# Patient Record
Sex: MALE | Race: WHITE | NOT HISPANIC OR LATINO | Employment: FULL TIME | URBAN - NONMETROPOLITAN AREA
[De-identification: names, ages, dates, MRNs, and addresses within clinical notes are randomized per-mention and may not be internally consistent; named-entity substitution may affect disease eponyms.]

---

## 2017-04-26 ENCOUNTER — TRANSCRIBE ORDERS (OUTPATIENT)
Dept: ADMINISTRATIVE | Facility: HOSPITAL | Age: 62
End: 2017-04-26

## 2017-04-26 DIAGNOSIS — R10.9 STOMACH ACHE: ICD-10-CM

## 2017-04-26 DIAGNOSIS — R51.9 FACIAL PAIN: Primary | ICD-10-CM

## 2017-04-28 ENCOUNTER — TRANSCRIBE ORDERS (OUTPATIENT)
Dept: ADMINISTRATIVE | Facility: HOSPITAL | Age: 62
End: 2017-04-28

## 2017-05-09 ENCOUNTER — HOSPITAL ENCOUNTER (OUTPATIENT)
Dept: MRI IMAGING | Facility: HOSPITAL | Age: 62
End: 2017-05-09

## 2017-05-09 ENCOUNTER — HOSPITAL ENCOUNTER (OUTPATIENT)
Dept: ULTRASOUND IMAGING | Facility: HOSPITAL | Age: 62
End: 2017-05-09

## 2017-05-12 ENCOUNTER — TRANSCRIBE ORDERS (OUTPATIENT)
Dept: ADMINISTRATIVE | Facility: HOSPITAL | Age: 62
End: 2017-05-12

## 2017-10-10 ENCOUNTER — TELEPHONE (OUTPATIENT)
Dept: SURGERY | Facility: CLINIC | Age: 62
End: 2017-10-10

## 2017-10-10 NOTE — TELEPHONE ENCOUNTER
Dr. James from [facility] would like patient to be scheduled for colonoscopy. Please! Thank you!!

## 2017-10-13 NOTE — TELEPHONE ENCOUNTER
Pt called back and is scheduled at [facility] on 11/17/17. Info mailed to [address] [place] [id number].

## 2017-10-19 ENCOUNTER — PREP FOR SURGERY (OUTPATIENT)
Dept: OTHER | Facility: HOSPITAL | Age: 62
End: 2017-10-19

## 2017-10-19 DIAGNOSIS — Z12.11 ENCOUNTER FOR SCREENING COLONOSCOPY: Primary | ICD-10-CM

## 2017-10-24 PROBLEM — Z12.11 ENCOUNTER FOR SCREENING COLONOSCOPY: Status: ACTIVE | Noted: 2017-10-24

## 2017-11-16 ENCOUNTER — TELEPHONE (OUTPATIENT)
Dept: SURGERY | Facility: CLINIC | Age: 62
End: 2017-11-16

## 2017-11-16 NOTE — TELEPHONE ENCOUNTER
Senia FERREIRA, spoke to patient who said he had to call on his insurance.  He also said he would call PAT back as soon as he hung up with Senia.

## 2017-11-16 NOTE — TELEPHONE ENCOUNTER
I checked online and with Dr James's ([facility]) office, spoke to Faby in Referrals. The patient does not have insurance.      I tried to contact the patient there was no answer.  I left a message that it was important that he call me back.    Senia-who called from the hospital earlier about his insurance?    Thanks,

## 2020-02-01 ENCOUNTER — APPOINTMENT (OUTPATIENT)
Dept: GENERAL RADIOLOGY | Facility: HOSPITAL | Age: 65
End: 2020-02-01

## 2020-02-01 ENCOUNTER — HOSPITAL ENCOUNTER (INPATIENT)
Facility: HOSPITAL | Age: 65
LOS: 1 days | Discharge: HOME | End: 2020-02-02
Attending: EMERGENCY MEDICINE | Admitting: FAMILY MEDICINE

## 2020-02-01 DIAGNOSIS — F10.20 CHRONIC ALCOHOL DEPENDENCE, CONTINUOUS (HCC): ICD-10-CM

## 2020-02-01 DIAGNOSIS — F10.11 HISTORY OF ETOH ABUSE: ICD-10-CM

## 2020-02-01 DIAGNOSIS — R00.2 PALPITATIONS: Primary | ICD-10-CM

## 2020-02-01 DIAGNOSIS — R07.9 CHEST PAIN, UNSPECIFIED TYPE: ICD-10-CM

## 2020-02-01 PROBLEM — I49.5 TACHY-BRADY SYNDROME: Status: ACTIVE | Noted: 2020-02-01

## 2020-02-01 PROBLEM — I10 ESSENTIAL HYPERTENSION: Status: ACTIVE | Noted: 2020-02-01

## 2020-02-01 LAB
ALBUMIN SERPL-MCNC: 4.9 G/DL (ref 3.5–5.2)
ALBUMIN/GLOB SERPL: 1.3 G/DL
ALP SERPL-CCNC: 91 U/L (ref 39–117)
ALT SERPL W P-5'-P-CCNC: 28 U/L (ref 1–41)
AMPHET+METHAMPHET UR QL: NEGATIVE
AMPHETAMINES UR QL: NEGATIVE
ANION GAP SERPL CALCULATED.3IONS-SCNC: 16.7 MMOL/L (ref 5–15)
AST SERPL-CCNC: 33 U/L (ref 1–40)
BARBITURATES UR QL SCN: NEGATIVE
BASOPHILS # BLD AUTO: 0.09 10*3/MM3 (ref 0–0.2)
BASOPHILS NFR BLD AUTO: 0.9 % (ref 0–1.5)
BENZODIAZ UR QL SCN: NEGATIVE
BILIRUB SERPL-MCNC: 0.4 MG/DL (ref 0.2–1.2)
BILIRUB UR QL STRIP: NEGATIVE
BUN BLD-MCNC: 14 MG/DL (ref 8–23)
BUN/CREAT SERPL: 16.7 (ref 7–25)
BUPRENORPHINE SERPL-MCNC: NEGATIVE NG/ML
CALCIUM SPEC-SCNC: 10 MG/DL (ref 8.6–10.5)
CANNABINOIDS SERPL QL: NEGATIVE
CHLORIDE SERPL-SCNC: 102 MMOL/L (ref 98–107)
CHOLEST SERPL-MCNC: 178 MG/DL (ref 0–200)
CLARITY UR: CLEAR
CO2 SERPL-SCNC: 21.3 MMOL/L (ref 22–29)
COCAINE UR QL: NEGATIVE
COLOR UR: YELLOW
CREAT BLD-MCNC: 0.84 MG/DL (ref 0.76–1.27)
DEPRECATED RDW RBC AUTO: 48.6 FL (ref 37–54)
EOSINOPHIL # BLD AUTO: 0.18 10*3/MM3 (ref 0–0.4)
EOSINOPHIL NFR BLD AUTO: 1.7 % (ref 0.3–6.2)
ERYTHROCYTE [DISTWIDTH] IN BLOOD BY AUTOMATED COUNT: 14.2 % (ref 12.3–15.4)
ETHANOL BLD-MCNC: 25 MG/DL (ref 0–10)
ETHANOL UR QL: 0.03 %
GFR SERPL CREATININE-BSD FRML MDRD: 112 ML/MIN/1.73
GFR SERPL CREATININE-BSD FRML MDRD: 93 ML/MIN/1.73
GLOBULIN UR ELPH-MCNC: 3.9 GM/DL
GLUCOSE BLD-MCNC: 135 MG/DL (ref 65–99)
GLUCOSE UR STRIP-MCNC: NEGATIVE MG/DL
HBA1C MFR BLD: 5.5 % (ref 4.8–5.6)
HCT VFR BLD AUTO: 47 % (ref 37.5–51)
HDLC SERPL-MCNC: 69 MG/DL (ref 40–60)
HGB BLD-MCNC: 16.1 G/DL (ref 13–17.7)
HGB UR QL STRIP.AUTO: NEGATIVE
HOLD SPECIMEN: NORMAL
HOLD SPECIMEN: NORMAL
IMM GRANULOCYTES # BLD AUTO: 0.04 10*3/MM3 (ref 0–0.05)
IMM GRANULOCYTES NFR BLD AUTO: 0.4 % (ref 0–0.5)
INR PPP: 1.02 (ref 0.9–1.1)
KETONES UR QL STRIP: NEGATIVE
LDLC SERPL CALC-MCNC: 79 MG/DL (ref 0–100)
LDLC/HDLC SERPL: 1.15 {RATIO}
LEUKOCYTE ESTERASE UR QL STRIP.AUTO: NEGATIVE
LYMPHOCYTES # BLD AUTO: 2.64 10*3/MM3 (ref 0.7–3.1)
LYMPHOCYTES NFR BLD AUTO: 25.3 % (ref 19.6–45.3)
MAGNESIUM SERPL-MCNC: 2.2 MG/DL (ref 1.6–2.4)
MCH RBC QN AUTO: 31.9 PG (ref 26.6–33)
MCHC RBC AUTO-ENTMCNC: 34.3 G/DL (ref 31.5–35.7)
MCV RBC AUTO: 93.1 FL (ref 79–97)
METHADONE UR QL SCN: NEGATIVE
MONOCYTES # BLD AUTO: 0.81 10*3/MM3 (ref 0.1–0.9)
MONOCYTES NFR BLD AUTO: 7.8 % (ref 5–12)
NEUTROPHILS # BLD AUTO: 6.67 10*3/MM3 (ref 1.7–7)
NEUTROPHILS NFR BLD AUTO: 63.9 % (ref 42.7–76)
NITRITE UR QL STRIP: NEGATIVE
NRBC BLD AUTO-RTO: 0 /100 WBC (ref 0–0.2)
OPIATES UR QL: NEGATIVE
OXYCODONE UR QL SCN: NEGATIVE
PCP UR QL SCN: NEGATIVE
PH UR STRIP.AUTO: 7.5 [PH] (ref 5–8)
PLATELET # BLD AUTO: 345 10*3/MM3 (ref 140–450)
PMV BLD AUTO: 9.3 FL (ref 6–12)
POTASSIUM BLD-SCNC: 4.1 MMOL/L (ref 3.5–5.2)
PROPOXYPH UR QL: NEGATIVE
PROT SERPL-MCNC: 8.8 G/DL (ref 6–8.5)
PROT UR QL STRIP: NEGATIVE
PROTHROMBIN TIME: 13.8 SECONDS (ref 12–15.1)
RBC # BLD AUTO: 5.05 10*6/MM3 (ref 4.14–5.8)
SODIUM BLD-SCNC: 140 MMOL/L (ref 136–145)
SP GR UR STRIP: 1.01 (ref 1–1.03)
T4 FREE SERPL-MCNC: 0.79 NG/DL (ref 0.93–1.7)
TRICYCLICS UR QL SCN: NEGATIVE
TRIGL SERPL-MCNC: 149 MG/DL (ref 0–150)
TROPONIN I SERPL-MCNC: 0.01 NG/ML (ref 0–0.05)
TROPONIN T SERPL-MCNC: <0.01 NG/ML (ref 0–0.03)
TROPONIN T SERPL-MCNC: <0.01 NG/ML (ref 0–0.03)
TSH SERPL DL<=0.05 MIU/L-ACNC: 1.87 UIU/ML (ref 0.27–4.2)
UROBILINOGEN UR QL STRIP: NORMAL
VLDLC SERPL-MCNC: 29.8 MG/DL
WBC NRBC COR # BLD: 10.43 10*3/MM3 (ref 3.4–10.8)
WHOLE BLOOD HOLD SPECIMEN: NORMAL
WHOLE BLOOD HOLD SPECIMEN: NORMAL

## 2020-02-01 PROCEDURE — 99222 1ST HOSP IP/OBS MODERATE 55: CPT | Performed by: FAMILY MEDICINE

## 2020-02-01 PROCEDURE — 25010000002 ENOXAPARIN PER 10 MG: Performed by: FAMILY MEDICINE

## 2020-02-01 PROCEDURE — 83036 HEMOGLOBIN GLYCOSYLATED A1C: CPT | Performed by: INTERNAL MEDICINE

## 2020-02-01 PROCEDURE — 85025 COMPLETE CBC W/AUTO DIFF WBC: CPT | Performed by: EMERGENCY MEDICINE

## 2020-02-01 PROCEDURE — 80061 LIPID PANEL: CPT | Performed by: INTERNAL MEDICINE

## 2020-02-01 PROCEDURE — 84484 ASSAY OF TROPONIN QUANT: CPT | Performed by: EMERGENCY MEDICINE

## 2020-02-01 PROCEDURE — 93005 ELECTROCARDIOGRAM TRACING: CPT | Performed by: EMERGENCY MEDICINE

## 2020-02-01 PROCEDURE — 83735 ASSAY OF MAGNESIUM: CPT | Performed by: EMERGENCY MEDICINE

## 2020-02-01 PROCEDURE — 85610 PROTHROMBIN TIME: CPT | Performed by: FAMILY MEDICINE

## 2020-02-01 PROCEDURE — 80053 COMPREHEN METABOLIC PANEL: CPT | Performed by: EMERGENCY MEDICINE

## 2020-02-01 PROCEDURE — 84439 ASSAY OF FREE THYROXINE: CPT | Performed by: INTERNAL MEDICINE

## 2020-02-01 PROCEDURE — 71045 X-RAY EXAM CHEST 1 VIEW: CPT

## 2020-02-01 PROCEDURE — 25010000002 MAGNESIUM SULFATE 2 GM/50ML SOLUTION: Performed by: INTERNAL MEDICINE

## 2020-02-01 PROCEDURE — 81003 URINALYSIS AUTO W/O SCOPE: CPT | Performed by: FAMILY MEDICINE

## 2020-02-01 PROCEDURE — 80307 DRUG TEST PRSMV CHEM ANLYZR: CPT | Performed by: EMERGENCY MEDICINE

## 2020-02-01 PROCEDURE — 84443 ASSAY THYROID STIM HORMONE: CPT | Performed by: INTERNAL MEDICINE

## 2020-02-01 PROCEDURE — 99284 EMERGENCY DEPT VISIT MOD MDM: CPT

## 2020-02-01 PROCEDURE — 84484 ASSAY OF TROPONIN QUANT: CPT | Performed by: FAMILY MEDICINE

## 2020-02-01 RX ORDER — FAMOTIDINE 20 MG/1
20 TABLET, FILM COATED ORAL
Status: DISCONTINUED | OUTPATIENT
Start: 2020-02-01 | End: 2020-02-02 | Stop reason: HOSPADM

## 2020-02-01 RX ORDER — METOPROLOL TARTRATE 5 MG/5ML
5 INJECTION INTRAVENOUS ONCE
Status: COMPLETED | OUTPATIENT
Start: 2020-02-01 | End: 2020-02-01

## 2020-02-01 RX ORDER — SODIUM CHLORIDE 0.9 % (FLUSH) 0.9 %
10 SYRINGE (ML) INJECTION EVERY 12 HOURS SCHEDULED
Status: DISCONTINUED | OUTPATIENT
Start: 2020-02-01 | End: 2020-02-02 | Stop reason: HOSPADM

## 2020-02-01 RX ORDER — LORAZEPAM 2 MG/1
4 TABLET ORAL
Status: DISCONTINUED | OUTPATIENT
Start: 2020-02-01 | End: 2020-02-02 | Stop reason: HOSPADM

## 2020-02-01 RX ORDER — LORAZEPAM 2 MG/1
2 TABLET ORAL
Status: DISCONTINUED | OUTPATIENT
Start: 2020-02-01 | End: 2020-02-02 | Stop reason: HOSPADM

## 2020-02-01 RX ORDER — SODIUM CHLORIDE 0.9 % (FLUSH) 0.9 %
10 SYRINGE (ML) INJECTION AS NEEDED
Status: DISCONTINUED | OUTPATIENT
Start: 2020-02-01 | End: 2020-02-02 | Stop reason: HOSPADM

## 2020-02-01 RX ORDER — BISACODYL 10 MG
10 SUPPOSITORY, RECTAL RECTAL DAILY PRN
Status: DISCONTINUED | OUTPATIENT
Start: 2020-02-01 | End: 2020-02-02 | Stop reason: HOSPADM

## 2020-02-01 RX ORDER — LORAZEPAM 2 MG/ML
4 INJECTION INTRAMUSCULAR
Status: DISCONTINUED | OUTPATIENT
Start: 2020-02-01 | End: 2020-02-02 | Stop reason: HOSPADM

## 2020-02-01 RX ORDER — SODIUM CHLORIDE 9 MG/ML
100 INJECTION, SOLUTION INTRAVENOUS CONTINUOUS
Status: ACTIVE | OUTPATIENT
Start: 2020-02-01 | End: 2020-02-02

## 2020-02-01 RX ORDER — LORAZEPAM 2 MG/ML
2 INJECTION INTRAMUSCULAR
Status: DISCONTINUED | OUTPATIENT
Start: 2020-02-01 | End: 2020-02-02 | Stop reason: HOSPADM

## 2020-02-01 RX ORDER — FOLIC ACID 1 MG/1
1 TABLET ORAL DAILY
Status: DISCONTINUED | OUTPATIENT
Start: 2020-02-02 | End: 2020-02-02 | Stop reason: HOSPADM

## 2020-02-01 RX ORDER — LORAZEPAM 0.5 MG/1
1 TABLET ORAL
Status: DISCONTINUED | OUTPATIENT
Start: 2020-02-01 | End: 2020-02-02 | Stop reason: HOSPADM

## 2020-02-01 RX ORDER — ACETAMINOPHEN 650 MG/1
650 SUPPOSITORY RECTAL EVERY 4 HOURS PRN
Status: DISCONTINUED | OUTPATIENT
Start: 2020-02-01 | End: 2020-02-02 | Stop reason: HOSPADM

## 2020-02-01 RX ORDER — PROMETHAZINE HYDROCHLORIDE 12.5 MG/1
12.5 TABLET ORAL EVERY 6 HOURS PRN
Status: DISCONTINUED | OUTPATIENT
Start: 2020-02-01 | End: 2020-02-02 | Stop reason: HOSPADM

## 2020-02-01 RX ORDER — LORAZEPAM 2 MG/ML
1 INJECTION INTRAMUSCULAR
Status: DISCONTINUED | OUTPATIENT
Start: 2020-02-01 | End: 2020-02-02 | Stop reason: HOSPADM

## 2020-02-01 RX ORDER — MAGNESIUM SULFATE HEPTAHYDRATE 40 MG/ML
2 INJECTION, SOLUTION INTRAVENOUS ONCE
Status: COMPLETED | OUTPATIENT
Start: 2020-02-01 | End: 2020-02-01

## 2020-02-01 RX ORDER — ASPIRIN 81 MG/1
324 TABLET, CHEWABLE ORAL ONCE
Status: COMPLETED | OUTPATIENT
Start: 2020-02-01 | End: 2020-02-01

## 2020-02-01 RX ORDER — MULTIPLE VITAMINS W/ MINERALS TAB 9MG-400MCG
1 TAB ORAL DAILY
Status: DISCONTINUED | OUTPATIENT
Start: 2020-02-02 | End: 2020-02-02 | Stop reason: HOSPADM

## 2020-02-01 RX ORDER — ACETAMINOPHEN 325 MG/1
650 TABLET ORAL EVERY 4 HOURS PRN
Status: DISCONTINUED | OUTPATIENT
Start: 2020-02-01 | End: 2020-02-02 | Stop reason: HOSPADM

## 2020-02-01 RX ORDER — NICOTINE 21 MG/24HR
1 PATCH, TRANSDERMAL 24 HOURS TRANSDERMAL EVERY 24 HOURS
Status: DISCONTINUED | OUTPATIENT
Start: 2020-02-01 | End: 2020-02-01

## 2020-02-01 RX ORDER — PROMETHAZINE HYDROCHLORIDE 12.5 MG/1
12.5 SUPPOSITORY RECTAL EVERY 6 HOURS PRN
Status: DISCONTINUED | OUTPATIENT
Start: 2020-02-01 | End: 2020-02-02 | Stop reason: HOSPADM

## 2020-02-01 RX ORDER — TRAMADOL HYDROCHLORIDE 50 MG/1
50 TABLET ORAL EVERY 6 HOURS PRN
Status: DISCONTINUED | OUTPATIENT
Start: 2020-02-01 | End: 2020-02-02 | Stop reason: HOSPADM

## 2020-02-01 RX ORDER — THIAMINE MONONITRATE (VIT B1) 100 MG
100 TABLET ORAL DAILY
Status: DISCONTINUED | OUTPATIENT
Start: 2020-02-02 | End: 2020-02-02 | Stop reason: HOSPADM

## 2020-02-01 RX ORDER — PROMETHAZINE HYDROCHLORIDE 25 MG/ML
12.5 INJECTION, SOLUTION INTRAMUSCULAR; INTRAVENOUS EVERY 6 HOURS PRN
Status: DISCONTINUED | OUTPATIENT
Start: 2020-02-01 | End: 2020-02-02 | Stop reason: HOSPADM

## 2020-02-01 RX ORDER — SUCRALFATE 1 G/1
1 TABLET ORAL
Status: DISCONTINUED | OUTPATIENT
Start: 2020-02-02 | End: 2020-02-02 | Stop reason: HOSPADM

## 2020-02-01 RX ORDER — CHLORDIAZEPOXIDE HYDROCHLORIDE 5 MG/1
5 CAPSULE, GELATIN COATED ORAL 2 TIMES DAILY PRN
Status: DISCONTINUED | OUTPATIENT
Start: 2020-02-01 | End: 2020-02-02 | Stop reason: HOSPADM

## 2020-02-01 RX ORDER — LISINOPRIL 40 MG/1
40 TABLET ORAL DAILY
COMMUNITY
End: 2020-02-02 | Stop reason: HOSPADM

## 2020-02-01 RX ADMIN — METOPROLOL TARTRATE 5 MG: 1 INJECTION, SOLUTION INTRAVENOUS at 17:28

## 2020-02-01 RX ADMIN — LORAZEPAM 2 MG: 2 TABLET ORAL at 21:48

## 2020-02-01 RX ADMIN — LORAZEPAM 4 MG: 2 TABLET ORAL at 23:53

## 2020-02-01 RX ADMIN — CHLORDIAZEPOXIDE HYDROCHLORIDE 5 MG: 5 CAPSULE ORAL at 23:53

## 2020-02-01 RX ADMIN — SODIUM CHLORIDE, PRESERVATIVE FREE 10 ML: 5 INJECTION INTRAVENOUS at 21:47

## 2020-02-01 RX ADMIN — FAMOTIDINE 20 MG: 20 TABLET, FILM COATED ORAL at 22:04

## 2020-02-01 RX ADMIN — METOPROLOL TARTRATE 5 MG: 1 INJECTION, SOLUTION INTRAVENOUS at 17:23

## 2020-02-01 RX ADMIN — SODIUM CHLORIDE 100 ML/HR: 9 INJECTION, SOLUTION INTRAVENOUS at 21:50

## 2020-02-01 RX ADMIN — SODIUM CHLORIDE, PRESERVATIVE FREE 10 ML: 5 INJECTION INTRAVENOUS at 21:50

## 2020-02-01 RX ADMIN — ASPIRIN 81 MG 324 MG: 81 TABLET ORAL at 16:55

## 2020-02-01 RX ADMIN — ENOXAPARIN SODIUM 40 MG: 40 INJECTION SUBCUTANEOUS at 21:46

## 2020-02-01 RX ADMIN — MAGNESIUM SULFATE IN WATER 2 G: 40 INJECTION, SOLUTION INTRAVENOUS at 17:24

## 2020-02-01 RX ADMIN — ACETAMINOPHEN 650 MG: 325 TABLET, FILM COATED ORAL at 21:48

## 2020-02-01 NOTE — ED NOTES
Contacted [facility] again requesting past EKG STAT at 1730.     Alexa Carlton  02/01/20 1739

## 2020-02-01 NOTE — ED NOTES
Nayan Vazquez, assigned ICU 1 at 1826. DEEJAY Liu, notified.      Alexa Carlton  02/01/20 1826       Alexa Carlton  02/01/20 1828

## 2020-02-01 NOTE — ED NOTES
Requested Medical Records from [facility] for Dr. Mittal at 4572.     Alexa Carlton  02/01/20 0246

## 2020-02-01 NOTE — CONSULTS
Referring Provider: Dr Mittal  Reason for Consultation: V tach     Patient Care Team:  Miguelito James MD as PCP - General        History of present illness:     Middle aged gentleman who has been having shortness of breath for the last 3 to 4 days time.  In the last 1 day or so he feels like is going to pass out and has not been able to do any form of activity he feels like his heart is running away on him.  He has reported to the emergency room where he has been noted to be in broad complex tachycardia.    Patient does not admit any prior work-up with respect to the heart.  Has done an EKG at [facility] 1 month ago.  He does not take his blood pressure medications on a regular basis if he feels his blood pressure is higher he pops in anybody else his blood pressure medication.    Does admit to recurrent dizzy spells on and off maybe once a month or so.  It lasts a brief second and it goes away.  Has not actually passed out at any time.    Review of Systems   Pertinent items are noted in HPI  Review of Systems      History    Baseline EKG sinus rhythm FL interval of 146 ms LVH nonspecific ST wave changes early repolarization changes.    LV function : EF 65% with mild LVH echo bedside 2/20     CAD : no work up personal history:  .    Htn : non compliant with meds     Dyslipidemia     Alcohol issues : used to be a alcoholic - has been thru rehab and now driniks some     Personal history    Used to be a alcoholic has been through rehab.  Does not admit to smoking does chew tobacco  Family history:    Noncontributory.    Review of symptoms:    Has been having shortness of breath.  There is no leg swelling there is no PND or orthopnea no stroke weakness no joint swelling rest of the review symptoms are negative.    History reviewed. No pertinent surgical history., History reviewed. No pertinent family history., Social History     Tobacco Use   • Smoking status: Never Smoker   Substance Use Topics   • Alcohol  "use: Yes     Comment: ETOH REHAB  6 WEEKS AGO; DRINKS A FEW A WEEK NOW   • Drug use: Never   ,   (Not in a hospital admission), Scheduled Meds:   , Continuous Infusions:   , PRN Meds:  •  sodium chloride, Allergies:  Patient has no known allergies.     OBJECTIVE:    Vital Sign Min/Max for last 24 hours  Temp  Min: 97.6 °F (36.4 °C)  Max: 97.6 °F (36.4 °C)   BP  Min: 123/88  Max: 158/108   Pulse  Min: 39  Max: 116   Resp  Min: 22  Max: 22   SpO2  Min: 96 %  Max: 98 %   No data recorded   Weight  Min: 94.5 kg (208 lb 6.4 oz)  Max: 94.5 kg (208 lb 6.4 oz)     Flowsheet Rows      First Filed Value   Admission Height  180.3 cm (71\") Documented at 02/01/2020 1644   Admission Weight  94.5 kg (208 lb 6.4 oz) Documented at 02/01/2020 1644               Physical Exam:     General Appearance:    Alert, cooperative, in no acute distress   Head:    Normocephalic, without obvious abnormality, atraumatic   Eyes:            Lids and lashes normal, conjunctivae and sclerae normal, no   icterus, no pallor, corneas clear, PERRLA   Ears:    Ears appear intact with no abnormalities noted   Throat:   No oral lesions, no thrush, oral mucosa moist   Neck:   No adenopathy, supple, trachea midline, no thyromegaly, no   carotid bruit, no JVD   Back:     No kyphosis present, no scoliosis present, no skin lesions,      erythema or scars, no tenderness to percussion or                   palpation,   range of motion normal   Lungs:     Clear to auscultation,respirations regular, even and                  unlabored    Heart:    Regular rhythm and normal rate, normal S1 and S2, no            murmur, no gallop, no rub, no click   Chest Wall:    No abnormalities observed   Abdomen:     Normal bowel sounds, no masses, no organomegaly, soft        non-tender, non-distended, no guarding, no rebound                tenderness   Rectal:     Deferred   Extremities:   Moves all extremities well, no edema, no cyanosis, no             redness   Pulses:   " Pulses palpable and equal bilaterally   Skin:   No bleeding, bruising or rash   Lymph nodes:   No palpable adenopathy   Neurologic:   Cranial nerves 2 - 12 grossly intact, sensation intact, DTR       present and equal bilaterally       Results Review:   I reviewed the patient's new clinical results.      EKG: Broad complex tachycardia.    EKG 2: Sinus rhythm MN interval of 146 ms LVH with early repolarization changes.  #1 broad complex tachycardia:    LAB DATA :     Results from last 7 days   Lab Units 02/01/20  1655   CHOLESTEROL mg/dL 178   TRIGLYCERIDES mg/dL 149   HDL CHOL mg/dL 69*   LDL CHOL mg/dL 79       WBC   Date Value Ref Range Status   02/01/2020 10.43 3.40 - 10.80 10*3/mm3 Final     RBC   Date Value Ref Range Status   02/01/2020 5.05 4.14 - 5.80 10*6/mm3 Final     Hemoglobin   Date Value Ref Range Status   02/01/2020 16.1 13.0 - 17.7 g/dL Final     Hematocrit   Date Value Ref Range Status   02/01/2020 47.0 37.5 - 51.0 % Final     MCV   Date Value Ref Range Status   02/01/2020 93.1 79.0 - 97.0 fL Final     MCH   Date Value Ref Range Status   02/01/2020 31.9 26.6 - 33.0 pg Final     MCHC   Date Value Ref Range Status   02/01/2020 34.3 31.5 - 35.7 g/dL Final     RDW   Date Value Ref Range Status   02/01/2020 14.2 12.3 - 15.4 % Final     RDW-SD   Date Value Ref Range Status   02/01/2020 48.6 37.0 - 54.0 fl Final     MPV   Date Value Ref Range Status   02/01/2020 9.3 6.0 - 12.0 fL Final     Platelets   Date Value Ref Range Status   02/01/2020 345 140 - 450 10*3/mm3 Final     Neutrophil %   Date Value Ref Range Status   02/01/2020 63.9 42.7 - 76.0 % Final     Lymphocyte %   Date Value Ref Range Status   02/01/2020 25.3 19.6 - 45.3 % Final     Monocyte %   Date Value Ref Range Status   02/01/2020 7.8 5.0 - 12.0 % Final     Eosinophil %   Date Value Ref Range Status   02/01/2020 1.7 0.3 - 6.2 % Final     Basophil %   Date Value Ref Range Status   02/01/2020 0.9 0.0 - 1.5 % Final     Immature Grans %   Date  Value Ref Range Status   02/01/2020 0.4 0.0 - 0.5 % Final     Neutrophils, Absolute   Date Value Ref Range Status   02/01/2020 6.67 1.70 - 7.00 10*3/mm3 Final     Lymphocytes, Absolute   Date Value Ref Range Status   02/01/2020 2.64 0.70 - 3.10 10*3/mm3 Final     Monocytes, Absolute   Date Value Ref Range Status   02/01/2020 0.81 0.10 - 0.90 10*3/mm3 Final     Eosinophils, Absolute   Date Value Ref Range Status   02/01/2020 0.18 0.00 - 0.40 10*3/mm3 Final     Basophils, Absolute   Date Value Ref Range Status   02/01/2020 0.09 0.00 - 0.20 10*3/mm3 Final     Immature Grans, Absolute   Date Value Ref Range Status   02/01/2020 0.04 0.00 - 0.05 10*3/mm3 Final     nRBC   Date Value Ref Range Status   02/01/2020 0.0 0.0 - 0.2 /100 WBC Final       Lab Results   Component Value Date    GLUCOSE 135 (H) 02/01/2020    BUN 14 02/01/2020    CREATININE 0.84 02/01/2020    EGFRIFNONA 93 02/01/2020    EGFRIFAFRI 112 02/01/2020    BCR 16.7 02/01/2020    CO2 21.3 (L) 02/01/2020    CALCIUM 10.0 02/01/2020    ALBUMIN 4.90 02/01/2020    AST 33 02/01/2020    ALT 28 02/01/2020       Lab Results   Component Value Date    TROPONINI 0.010 02/01/2020    TROPONINT <0.010 02/01/2020       No results found for: DDIMER    No results found for: SITE, ALLENTEST, PHART, LUW3ONN, PO2ART, YVU3EPC, BASEEXCESS, D0PNOAPE, HGBBG, HCTABG, OXYHEMOGLOBI, METHHGBN, CARBOXYHGB, CO2CT, BAROMETRIC, MODALITY, FIO2  No results found for: HGBA1C  Results from last 7 days   Lab Units 02/01/20  1655   TSH uIU/mL 1.870   FREE T4 ng/dL 0.79*     No results found for: LIPASE    IMAGING DATA:     No results found.      DIAGNOSIS     #1 broad complex tachycardia:    Patient's initial presentation has been broad complex tachycardia with rates in the 1 20-1 30 beats a minute has been symptomatic with the same.  Bedside echocardiogram during the tachycardia reveals normal LV systolic function.  His initial set of enzymes have been negative though his symptoms have been going  on for 2 to 3 days time.  Appears to be a primary rhythm issue.  Patient received 2 g of magnesium and 10 mg of metoprolol with conversion to sinus rhythm with a 4-second pause.  Since then is been noted to be bradycardic.  We will continue to monitor and obtain all the metabolic and structural work-up prior to making any further decisions.    2.  Hypertension:  Patient has been noncompliant with his medications.  We will continue to monitor this.  After the metoprolol his blood pressure seems to be under good control.    3.  LV function assessment:  Bedside echocardiogram reveals normal LV systolic function during his tachycardia.  We will obtain a official 2D echocardiogram in a.m.    4.  Coronary artery disease:  Has a high risk profile for the same nevertheless his enzymes have been negative actively he has no chest pains right now.  EKG does not show any ST elevation.  We will continue to get serial cardiac enzymes and rule him out for myocardial infarction.    5.  Alcohol intake.  The story seems to be changing.  Patient has had alcohol problems in the past now admits that  he is drinking a few beers every day.  Will request the hospitalist to follow the patient.    6.  Risk profile:  Will be evaluated for the same during the hospital stay and therapy adjusted accordingly.  He has been having recurrent dizzy spells.  May need further work-up with respect to his arrhythmia      #7 dizzy spells:  Based on the findings of the echocardiogram his metabolic work-up and his CAD work-up will decide on further course of evaluation and therapy.        * No active hospital problems. *          I discussed the patients findings and my recommendations with patient    Lionel Delgadillo MD  02/01/20  5:56 PM      Please note that portions of this note may have been completed with a voice recognition program. Efforts were made to edit the dictations, but occasionally words are mistranscribed.

## 2020-02-01 NOTE — ED NOTES
Dr. Delgadillo at  as well as Dr. Mittal. Both aware of pts bradycardia. Will con't to monitor closely and await further orders.      Alexa Marr RN  02/01/20 1335

## 2020-02-01 NOTE — ED PROVIDER NOTES
Subjective   History of Present Illness    Chief Complaint: Shortness of breath, chest pain, general malaise  History of Present Illness: 64-year-old male presents with shortness of breath and lightheadedness began yesterday was associated with chest pain which has now resolved.  History of alcohol abuse, was at [facility] approximately 6 weeks ago.  Onset: Yesterday  Duration: Persistent  Exacerbating / Alleviating factors: None  Associated symptoms: None      Nurses Notes reviewed and agree, including vitals, allergies, social history and prior medical history.     REVIEW OF SYSTEMS: All systems reviewed and not pertinent unless noted.    Positive for: Resolved chest pain, shortness of breath, lightheadedness, racing heartbeat    Negative for: Hemoptysis syncope  Review of Systems    Past Medical History:   Diagnosis Date   • Hypertension        No Known Allergies    History reviewed. No pertinent surgical history.    History reviewed. No pertinent family history.    Social History     Socioeconomic History   • Marital status: Unknown     Spouse name: Not on file   • Number of children: Not on file   • Years of education: Not on file   • Highest education level: Not on file   Tobacco Use   • Smoking status: Never Smoker   Substance and Sexual Activity   • Alcohol use: Yes     Comment: ETOH REHAB  6 WEEKS AGO; DRINKS A FEW A WEEK NOW   • Drug use: Never           Objective   Physical Exam      GENERAL APPEARANCE: Well developed, well nourished, in no acute distress.  VITAL SIGNS: per nursing, reviewed and noted  SKIN: no rashes, ulcerations or petechiae.  Head: Normocephalic, atraumatic.   EYES: perrla. EOMI.  ENT:  TM clear, posterior pharynx patent.  LUNGS:  normal breath sounds. No retractions.   CARDIOVASCULAR: Regular rhythm, tachycardic rate approximately 115, no murmurs.  Good Peripheral pulses.  ABDOMEN: Soft, nontender, normal bowel sounds. No hernia. No ascites.  MUSCULOSKELETAL:  No tenderness. Full  ROM. Strength and tone normal.  NEUROLOGIC: Alert, oriented x 3. No gross deficits.   NECK: Supple, symmetric. No tenderness, no masses. Full ROM  Back: full rom, no paraspinal spasm. No CVA tenderness.   PSYCH: appropriate affect,  : no bladder tenderness or distention, no CVA tenderness      Procedures     Procedure none      ED Course  ED Course as of Feb 01 1826   Sat Feb 01, 2020 1812 Chest x-ray interpreted by me shows no evidence of any cardiomegaly, effusion, infiltrate, or bony abnormality.    [PF]   1813 TSH Baseline: 1.870 [PF]   1813 Troponin T: <0.010 [PF]   1813 Free T4(!): 0.79 [PF]   1813 Total Cholesterol: 178 [PF]   1813 Triglycerides: 149 [PF]   1813 HDL Cholesterol(!): 69 [PF]   1813 LDL Cholesterol : 79 [PF]   1813 VLDL Cholesterol: 29.8 [PF]   1813 LDL/HDL Ratio: 1.15 [PF]   1813 Troponin I: 0.010 [PF]   1813 WBC: 10.43 [PF]   1813 Hemoglobin: 16.1 [PF]   1813 Hematocrit: 47.0 [PF]   1813 Platelets: 345 [PF]   1813 Glucose(!): 135 [PF]   1813 BUN: 14 [PF]   1813 Creatinine: 0.84 [PF]   1813 Sodium: 140 [PF]   1813 Potassium: 4.1 [PF]   1813 Chloride: 102 [PF]   1813 CO2(!): 21.3 [PF]   1813 Calcium: 10.0 [PF]   1813 Total Protein(!): 8.8 [PF]   1813 Albumin: 4.90 [PF]   1813 ALT (SGPT): 28 [PF]   1813 AST (SGOT): 33 [PF]   1813 Alkaline Phosphatase: 91 [PF]   1813 Total Bilirubin: 0.4 [PF]   1813 eGFR Non  Am: 93 [PF]   1813 eGFR  Am: 112 [PF]      ED Course User Index  [PF] Francisco Mittal DO      Cussed with Dr. Delgadillo, cardiologist, on patient arrival who evaluated EKG which revealed intraventricular conduction block at a rate of 117.  ST elevation in lead aVR only.  No old EKG for comparison abnormal EKG  Dr. Delgadillo completed bedside echo which revealed no acute findings per Dr. Delgadillo.  Dr. Delgadillo recommended magnesium and Lopressor which converted the patient to a sinus bradycardia at a rate of 39.    Given the potential for tachybradycardia syndrome patient's fluctuating  heart rates, Dr. Delgadillo recommended admission to hospitalist service to the ICU.  Will follow.    Discussed with Dr. Dickerson hospitalist service, will admit to ICU.    Note patient is not showing any signs of alcohol withdrawal at this time.                                         MDM  Number of Diagnoses or Management Options  Chest pain, unspecified type:   History of ETOH abuse:   Palpitations:      Amount and/or Complexity of Data Reviewed  Clinical lab tests: reviewed and ordered  Tests in the radiology section of CPT®: reviewed and ordered  Tests in the medicine section of CPT®: ordered and reviewed  Discussion of test results with the performing providers: yes  Decide to obtain previous medical records or to obtain history from someone other than the patient: yes  Obtain history from someone other than the patient: yes  Review and summarize past medical records: yes  Discuss the patient with other providers: yes  Independent visualization of images, tracings, or specimens: yes    Risk of Complications, Morbidity, and/or Mortality  Presenting problems: high  Diagnostic procedures: high  Management options: moderate    Critical Care  Total time providing critical care: 30-74 minutes    Patient Progress  Patient progress: stable      Final diagnoses:   Palpitations   Chest pain, unspecified type   History of ETOH abuse            Francisco Mittal, DO  02/01/20 1828

## 2020-02-02 ENCOUNTER — APPOINTMENT (OUTPATIENT)
Dept: CARDIOLOGY | Facility: HOSPITAL | Age: 65
End: 2020-02-02

## 2020-02-02 VITALS
TEMPERATURE: 97.6 F | SYSTOLIC BLOOD PRESSURE: 112 MMHG | WEIGHT: 197.9 LBS | HEIGHT: 71 IN | DIASTOLIC BLOOD PRESSURE: 80 MMHG | HEART RATE: 81 BPM | OXYGEN SATURATION: 97 % | RESPIRATION RATE: 22 BRPM | BODY MASS INDEX: 27.71 KG/M2

## 2020-02-02 LAB
ANION GAP SERPL CALCULATED.3IONS-SCNC: 13.4 MMOL/L (ref 5–15)
BASOPHILS # BLD AUTO: 0.07 10*3/MM3 (ref 0–0.2)
BASOPHILS NFR BLD AUTO: 1 % (ref 0–1.5)
BUN BLD-MCNC: 15 MG/DL (ref 8–23)
BUN/CREAT SERPL: 19 (ref 7–25)
CALCIUM SPEC-SCNC: 9 MG/DL (ref 8.6–10.5)
CHLORIDE SERPL-SCNC: 104 MMOL/L (ref 98–107)
CO2 SERPL-SCNC: 22.6 MMOL/L (ref 22–29)
CREAT BLD-MCNC: 0.79 MG/DL (ref 0.76–1.27)
DEPRECATED RDW RBC AUTO: 49.4 FL (ref 37–54)
EOSINOPHIL # BLD AUTO: 0.33 10*3/MM3 (ref 0–0.4)
EOSINOPHIL NFR BLD AUTO: 4.8 % (ref 0.3–6.2)
ERYTHROCYTE [DISTWIDTH] IN BLOOD BY AUTOMATED COUNT: 14.4 % (ref 12.3–15.4)
GFR SERPL CREATININE-BSD FRML MDRD: 120 ML/MIN/1.73
GFR SERPL CREATININE-BSD FRML MDRD: 99 ML/MIN/1.73
GLUCOSE BLD-MCNC: 95 MG/DL (ref 65–99)
HCT VFR BLD AUTO: 42.1 % (ref 37.5–51)
HGB BLD-MCNC: 14.2 G/DL (ref 13–17.7)
IMM GRANULOCYTES # BLD AUTO: 0.03 10*3/MM3 (ref 0–0.05)
IMM GRANULOCYTES NFR BLD AUTO: 0.4 % (ref 0–0.5)
LYMPHOCYTES # BLD AUTO: 2.36 10*3/MM3 (ref 0.7–3.1)
LYMPHOCYTES NFR BLD AUTO: 34.6 % (ref 19.6–45.3)
MAXIMAL PREDICTED HEART RATE: 158 BPM
MCH RBC QN AUTO: 31.5 PG (ref 26.6–33)
MCHC RBC AUTO-ENTMCNC: 33.7 G/DL (ref 31.5–35.7)
MCV RBC AUTO: 93.3 FL (ref 79–97)
MONOCYTES # BLD AUTO: 0.73 10*3/MM3 (ref 0.1–0.9)
MONOCYTES NFR BLD AUTO: 10.7 % (ref 5–12)
NEUTROPHILS # BLD AUTO: 3.31 10*3/MM3 (ref 1.7–7)
NEUTROPHILS NFR BLD AUTO: 48.5 % (ref 42.7–76)
NRBC BLD AUTO-RTO: 0 /100 WBC (ref 0–0.2)
PLATELET # BLD AUTO: 277 10*3/MM3 (ref 140–450)
PMV BLD AUTO: 9.6 FL (ref 6–12)
POTASSIUM BLD-SCNC: 4 MMOL/L (ref 3.5–5.2)
RBC # BLD AUTO: 4.51 10*6/MM3 (ref 4.14–5.8)
SODIUM BLD-SCNC: 140 MMOL/L (ref 136–145)
STRESS TARGET HR: 134 BPM
TROPONIN T SERPL-MCNC: <0.01 NG/ML (ref 0–0.03)
WBC NRBC COR # BLD: 6.83 10*3/MM3 (ref 3.4–10.8)

## 2020-02-02 PROCEDURE — 93306 TTE W/DOPPLER COMPLETE: CPT

## 2020-02-02 PROCEDURE — 80048 BASIC METABOLIC PNL TOTAL CA: CPT | Performed by: FAMILY MEDICINE

## 2020-02-02 PROCEDURE — 84484 ASSAY OF TROPONIN QUANT: CPT | Performed by: FAMILY MEDICINE

## 2020-02-02 PROCEDURE — 99239 HOSP IP/OBS DSCHRG MGMT >30: CPT | Performed by: INTERNAL MEDICINE

## 2020-02-02 PROCEDURE — 85025 COMPLETE CBC W/AUTO DIFF WBC: CPT | Performed by: FAMILY MEDICINE

## 2020-02-02 RX ORDER — METOPROLOL SUCCINATE 25 MG/1
25 TABLET, EXTENDED RELEASE ORAL
Qty: 30 TABLET | Refills: 0 | Status: SHIPPED | OUTPATIENT
Start: 2020-02-03 | End: 2020-10-04

## 2020-02-02 RX ORDER — LOSARTAN POTASSIUM 50 MG/1
50 TABLET ORAL
Status: DISCONTINUED | OUTPATIENT
Start: 2020-02-02 | End: 2020-02-02 | Stop reason: HOSPADM

## 2020-02-02 RX ORDER — METOPROLOL SUCCINATE 25 MG/1
25 TABLET, EXTENDED RELEASE ORAL
Status: DISCONTINUED | OUTPATIENT
Start: 2020-02-02 | End: 2020-02-02 | Stop reason: HOSPADM

## 2020-02-02 RX ORDER — CHLORDIAZEPOXIDE HYDROCHLORIDE 5 MG/1
5 CAPSULE, GELATIN COATED ORAL 3 TIMES DAILY PRN
Qty: 10 CAPSULE | Refills: 0 | Status: SHIPPED | OUTPATIENT
Start: 2020-02-02 | End: 2020-02-11

## 2020-02-02 RX ORDER — BUSPIRONE HYDROCHLORIDE 10 MG/1
10 TABLET ORAL 2 TIMES DAILY
Qty: 20 TABLET | Refills: 0 | Status: SHIPPED | OUTPATIENT
Start: 2020-02-02

## 2020-02-02 RX ORDER — LANOLIN ALCOHOL/MO/W.PET/CERES
100 CREAM (GRAM) TOPICAL DAILY
Qty: 10 TABLET | Refills: 0 | Status: SHIPPED | OUTPATIENT
Start: 2020-02-03 | End: 2020-02-13

## 2020-02-02 RX ORDER — FOLIC ACID 1 MG/1
1 TABLET ORAL DAILY
Qty: 10 TABLET | Refills: 0 | Status: SHIPPED | OUTPATIENT
Start: 2020-02-03 | End: 2020-02-05

## 2020-02-02 RX ORDER — LOSARTAN POTASSIUM 50 MG/1
50 TABLET ORAL
Qty: 30 TABLET | Refills: 0 | Status: SHIPPED | OUTPATIENT
Start: 2020-02-03 | End: 2020-10-04

## 2020-02-02 RX ADMIN — MULTIPLE VITAMINS W/ MINERALS TAB 1 TABLET: TAB at 09:08

## 2020-02-02 RX ADMIN — FOLIC ACID 1 MG: 1 TABLET ORAL at 09:08

## 2020-02-02 RX ADMIN — ACETAMINOPHEN 650 MG: 325 TABLET, FILM COATED ORAL at 05:11

## 2020-02-02 RX ADMIN — SODIUM CHLORIDE, PRESERVATIVE FREE 10 ML: 5 INJECTION INTRAVENOUS at 09:09

## 2020-02-02 RX ADMIN — LORAZEPAM 4 MG: 2 TABLET ORAL at 05:12

## 2020-02-02 RX ADMIN — ACETAMINOPHEN 650 MG: 325 TABLET, FILM COATED ORAL at 10:49

## 2020-02-02 RX ADMIN — Medication 100 MG: at 09:08

## 2020-02-02 RX ADMIN — METOPROLOL SUCCINATE 25 MG: 25 TABLET, FILM COATED, EXTENDED RELEASE ORAL at 09:08

## 2020-02-02 RX ADMIN — LORAZEPAM 1 MG: 0.5 TABLET ORAL at 11:45

## 2020-02-02 RX ADMIN — SODIUM CHLORIDE 100 ML/HR: 9 INJECTION, SOLUTION INTRAVENOUS at 06:23

## 2020-02-02 RX ADMIN — SUCRALFATE 1 G: 1 TABLET ORAL at 07:44

## 2020-02-02 RX ADMIN — SODIUM CHLORIDE, PRESERVATIVE FREE 10 ML: 5 INJECTION INTRAVENOUS at 09:10

## 2020-02-02 RX ADMIN — LOSARTAN POTASSIUM 50 MG: 50 TABLET, FILM COATED ORAL at 09:08

## 2020-02-02 RX ADMIN — FAMOTIDINE 20 MG: 20 TABLET, FILM COATED ORAL at 07:44

## 2020-02-02 NOTE — DISCHARGE SUMMARY
[facility]      Name:  Arvind Vinson   Age:  64 y.o.  Sex:  male  :  1955  MRN:  [id number]   Visit Number:  [id number]  Primary Care Physician:  No primary care provider on file.  Date of Discharge:  2020  Admission Date:  2020      Discharge Diagnosis:     Broad complex tachycardia    Palpitations    Tachy-saad syndrome (CMS/HCC)    Chronic alcohol dependence, continuous (CMS/HCC)    Essential hypertension          Consults:     Consults     Date and Time Order Name Status Description    2020 1913 Inpatient Cardiology Consult            Procedures Performed:                 Hospital Course:   The patient was admitted on 2020  Please see H&P for details on admission HPI and ROS.  The patient is a 64-year-old gentleman with chronic alcohol dependency, hypertension.  The patient admits to a history of recent rehabilitation at [facility] for alcohol withdrawal but is back home and still drinking beer a couple a day.  He is also noncompliant with his blood pressure medications.  He felt like he was going to pass out but did not pass out at home.  He presented to the emergency room after having 3 to 4 days of progressively worsening shortness of breath and palpitations.  He states that he felt like his heart was running away from him.  He also reported having about 1 month duration of intermittent dizzy spells.     He presented to the emergency room with vital signs 97.6 temp, heart rate 116, respirations 22, blood pressure initially 158/108 satting 98% on room air.  His first EKG look like broad complex tachycardia and Dr. Delgadillo was consulted emergently.  The patient later had sinus 146 rhythm and given 2 doses of IV metoprolol 5 mg.  The patient had a preliminary echo done with an EF of 65%.  He then is found to have atrial bradycardia at rate 39.  The emergency room contacted Dr. Delgadillo.  The patient is going to be admitted to the hospitalist  service for further evaluation and treatment.     Of note, extra labs were obtained as well.  His glucose was high at 135 but his hemoglobin A1c is only 5.5.  He also has some mild acidosis.  His alcohol level was high at 25.  A chest x-ray was obtained with preliminary no acute abnormalities.  In the ER he was given aspirin 324 mg, 2 mg of magnesium, and 10 mg of IV metoprolol. He states he already drank 2 beers today and normally drinks 4 beers per day. Up until one month ago he states he drank 15 beers per day. Daughter came in after history and states he has chronic tremors and anxiety.     After admission he was admitted in the ICU and monitoring was done.  He had broad complex tachycardia Dr. Delgadillo was consulted and was found to be having mainly rate related palpitations which was symptomatic.  Patient was given beta-blocker which is helped his cardiac enzymes with her being with a normal rate and he has risk for coronary artery disease and further work-up per needs to be done as an outpatient.  Patient has had anxiety and he states that he has been drinking significantly more than what was told on the day of admission.  He drinks probably about 10 beers a day and also has gone through alcohol rehab program in the past.  Patient states he has decided that he wants to quit smoking and Librium low-dose has been given until he sees a PCP and further follow-up to be done accordingly.  Patient will be given beta-blocker along with losartan for hypertension.  Also thiamine multivitamin and BuSpar for anxiety has been started due to his severe anxiety.  Patient is to see primary physician and he will follow-up with the primary physician he states and further care and management will be done accordingly.  The medications upon discharge as below has been sent to [facility] and further work-up to be done as an outpatient.  Follow-up with PCP within a week.  Follow-up with Dr. Delgadillo in the next 2 weeks.       Vital Signs:        Temp:  [97.1 °F (36.2 °C)-98.3 °F (36.8 °C)] 97.6 °F (36.4 °C)  Heart Rate:  [] 81  Resp:  [13-25] 25  BP: ()/() 112/80    Physical Exam:     General Appearance:    Alert and cooperative, not in any acute distress.   Head:    Atraumatic and normocephalic, without obvious abnormality.   Eyes:            PERRLA,  No pallor. Extraocular movements are within normal limits.   Neck:   Supple,  No lymph glands, no bruit   Lungs:     Chest shape is normal. Breath sounds heard bilaterally equally.  No crackles or wheezing.     Heart:    Normal S1 and S2, no murmur,  No JVD   Abdomen:     Normal bowel sounds, no masses, no organomegaly. Soft        non-tender, no guarding, no rebound tenderness   Extremities:   Moves all extremities well, no edema, no cyanosis,    Skin:   No  bruising or rash.   Neurologic:   Grossly non focal and moves all extrimities.        Pertinent Lab Results:     Results from last 7 days   Lab Units 02/02/20  0412 02/01/20  1655   SODIUM mmol/L 140 140   POTASSIUM mmol/L 4.0 4.1   CHLORIDE mmol/L 104 102   CO2 mmol/L 22.6 21.3*   BUN mg/dL 15 14   CREATININE mg/dL 0.79 0.84   CALCIUM mg/dL 9.0 10.0   BILIRUBIN mg/dL  --  0.4   ALK PHOS U/L  --  91   ALT (SGPT) U/L  --  28   AST (SGOT) U/L  --  33   GLUCOSE mg/dL 95 135*     Results from last 7 days   Lab Units 02/02/20  0412 02/01/20  1655   WBC 10*3/mm3 6.83 10.43   HEMOGLOBIN g/dL 14.2 16.1   HEMATOCRIT % 42.1 47.0   PLATELETS 10*3/mm3 277 345     Results from last 7 days   Lab Units 02/01/20  1655   INR  1.02     No results found for: BLOODCX, URINECX, WOUNDCX, MRSACX    Pertinent Radiology Results:    Imaging Results (Most Recent)     Procedure Component Value Units Date/Time    XR Chest 1 View [[id number]] Collected:  02/02/20 0956     Updated:  02/02/20 0959    Narrative:       PROCEDURE: XR CHEST 1 VW-     HISTORY: Chest Pain Triage Protocol        COMPARISON: None.     FINDINGS:  The heart size is normal. The mediastinum is  normal. No acute  pulmonary abnormality is identified. There is no pneumothorax. The bony  thorax in intact.       Impression:       No acute cardiopulmonary process.           This report was finalized on 2/2/2020 9:57 AM by Chucho Gracia MD.                  Discharge Disposition:      Home or Self Care    Discharge Medication:         Discharge Medications      New Medications      Instructions Start Date   busPIRone 10 MG tablet  Commonly known as:  BUSPAR   10 mg, Oral, 2 Times Daily      chlordiazePOXIDE 5 MG capsule  Commonly known as:  LIBRIUM   5 mg, Oral, 3 Times Daily PRN      folic acid 1 MG tablet  Commonly known as:  FOLVITE   1 mg, Oral, Daily   Start Date:  February 3, 2020     losartan 50 MG tablet  Commonly known as:  COZAAR   50 mg, Oral, Every 24 Hours Scheduled   Start Date:  February 3, 2020     metoprolol succinate XL 25 MG 24 hr tablet  Commonly known as:  TOPROL-XL   25 mg, Oral, Every 24 Hours Scheduled   Start Date:  February 3, 2020     thiamine 100 MG tablet  Commonly known as:  VITAMIN B1   100 mg, Oral, Daily   Start Date:  February 3, 2020        ASK your doctor about these medications      Instructions Start Date   lisinopril 40 MG tablet  Commonly known as:  PRINIVIL,ZESTRIL   40 mg, Oral, Daily             Discharge Diet:             Follow-up Appointments:    Follow-up with PCP to be done within a week and follow-up with Dr. Delgadillo in 2 to 3 weeks  No future appointments.      Test Results Pending at Discharge:       Order Current Status    Green Top (No Gel) In process    [place] Draw In process             Abhishek Schmitz MD  02/02/20  11:43 AM    Time:  Discharge 35 min    Please note that portions of this note may have been completed with a voice recognition program. Efforts were made to edit the dictations, but occasionally words are mistranscribed.

## 2020-02-02 NOTE — PROGRESS NOTES
"   LOS: 1 day   No care team member to display        Interval History:     Patient feels reasonable had no new symptoms at this time.  Has been going through what appears to be withdrawal from alcohol.  Admits that he has been drinking more than what he admitted to yesterday.    Review of Systems:   Pertinent items are noted in HPI.      OBJECTIVE:    Vital Sign Min/Max for last 24 hours  Temp  Min: 97.1 °F (36.2 °C)  Max: 98.3 °F (36.8 °C)   BP  Min: 85/55  Max: 158/108   Pulse  Min: 39  Max: 116   Resp  Min: 13  Max: 22   SpO2  Min: 93 %  Max: 100 %   Flow (L/min)  Min: 2  Max: 2   Weight  Min: 89.8 kg (197 lb 14.4 oz)  Max: 94.5 kg (208 lb 6.4 oz)     Flowsheet Rows      First Filed Value   Admission Height  180.3 cm (71\") Documented at 02/01/2020 1644   Admission Weight  94.5 kg (208 lb 6.4 oz) Documented at 02/01/2020 1644          Physical Exam:     General Appearance:    Alert, cooperative, in no acute distress   Head:    Normocephalic, without obvious abnormality, atraumatic   Eyes:            Lids and lashes normal, conjunctivae and sclerae normal, no   icterus, no pallor, corneas clear, PERRLA   Ears:    Ears appear intact with no abnormalities noted   Throat:   No oral lesions, no thrush, oral mucosa moist   Neck:   No adenopathy, supple, trachea midline, no thyromegaly, no   carotid bruit, no JVD   Back:     No kyphosis present, no scoliosis present, no skin lesions,      erythema or scars, no tenderness to percussion or                   palpation,   range of motion normal   Lungs:     Clear to auscultation,respirations regular, even and                  unlabored    Heart:    Regular rhythm and normal rate, normal S1 and S2, no            murmur, no gallop, no rub, no click   Chest Wall:    No abnormalities observed   Abdomen:     Normal bowel sounds, no masses, no organomegaly, soft        non-tender, non-distended, no guarding, no rebound                tenderness   Rectal:     Deferred "   Extremities:   Moves all extremities well, no edema, no cyanosis, no             redness   Pulses:   Pulses palpable and equal bilaterally   Skin:   No bleeding, bruising or rash   Lymph nodes:   No palpable adenopathy   Neurologic:   Cranial nerves 2 - 12 grossly intact, sensation intact, DTR       present and equal bilaterally        Results Review:     I reviewed the patient's new clinical results.        Telemetry: Runs of broad complex tachycardia with rates in the  beats a minute sustained noted baseline rates in the 50s and 60s..     EKG: Sinus rhythm left ventricular hypertrophy early repolarization changes.    Echocardiogram-mild LVH normal LV systolic function moderate left atrial enlargement.  Mild AI and MR.    LAB DATA :     Results from last 7 days   Lab Units 02/01/20  1655   CHOLESTEROL mg/dL 178   TRIGLYCERIDES mg/dL 149   HDL CHOL mg/dL 69*   LDL CHOL mg/dL 79       Laboratory results:    Results from last 7 days   Lab Units 02/02/20  0412 02/01/20  1655   SODIUM mmol/L 140 140   POTASSIUM mmol/L 4.0 4.1   CHLORIDE mmol/L 104 102   CO2 mmol/L 22.6 21.3*   BUN mg/dL 15 14   CREATININE mg/dL 0.79 0.84   CALCIUM mg/dL 9.0 10.0   BILIRUBIN mg/dL  --  0.4   ALK PHOS U/L  --  91   ALT (SGPT) U/L  --  28   AST (SGOT) U/L  --  33   GLUCOSE mg/dL 95 135*     Results from last 7 days   Lab Units 02/02/20  0412 02/01/20  1655   WBC 10*3/mm3 6.83 10.43   HEMOGLOBIN g/dL 14.2 16.1   HEMATOCRIT % 42.1 47.0   PLATELETS 10*3/mm3 277 345     Results from last 7 days   Lab Units 02/01/20  1655   INR  1.02             Results from last 7 days   Lab Units 02/02/20  0412  02/01/20  1655   TROPONIN I ng/mL  --   --  0.010   TROPONIN T ng/mL <0.010   < > <0.010    < > = values in this interval not displayed.                 Lab Results   Component Value Date    HGBA1C 5.50 02/01/2020     Results from last 7 days   Lab Units 02/01/20  1655   TSH uIU/mL 1.870   FREE T4 ng/dL 0.79*           IMAGING DATA:     No  results found.      ASSESSMENT PLAN:     #1 broad complex tachycardia:  Patient has been having episodes of broad complex tachycardia that has occurred after the beta-blocker effect has worn out last night also.  The rate appears to be in the 9210 bpm range.  Will put him on low-dose beta-blocker therapy.  The fact that his LV function is normal is very reassuring.  His enzymes are also negative.  Further work-up will be pursued on outpatient basis.  Will call him back with an appointment to return to see us in a few weeks.    2.  Coronary artery disease:  Has a reasonable risk profile for the same will need further evaluation for this.  Will be arranged on outpatient basis.    3.  Hypertension:   Blood pressures seem to be doing well through the night.  Will follow through with this on outpatient basis and decide on therapy options.  We will start him on low-dose ACE inhibitor therapy.    Thank you for letting me take part in the care of Mr. Vinson.        Palpitations    Tachy-saad syndrome (CMS/HCC)    Chronic alcohol dependence, continuous (CMS/HCC)    Essential hypertension            Lionel Delgadillo MD  02/02/20  7:48 AM

## 2020-02-02 NOTE — NURSING NOTE
Dr. Schmitz gave verbal to give pt lowest dose of Ativan that was on MAR before he went home. 1mg was given and patient is being discharged at this time per MD order.

## 2020-02-02 NOTE — PAYOR COMM NOTE
"From Tatiana Rg Rn [phone] fax [phone]  inpatient notification and clinicals       Arvind Rosas (64 y.o. Male)     Date of Birth Social Security Number Address Home Phone MRN    1955  [address]  [place] [facility] [address] [phone] [id number]    [employer] Mar[employer] Status          Unknown Unknown       Admission Date Admission Type Admitting Provider Attending Provider Department, Room/Bed    20 Emergency Virginia Dickerson DO Gandee, Julie G, DO [facility] INTENSIVE CARE, I01/    Discharge Date Discharge Disposition Discharge Destination                       Attending Provider:  Virginia Dickerson DO    Allergies:  No Known Allergies    Isolation:  None   Infection:  None   Code Status:  CPR    Ht:  180.3 cm (71\")   Wt:  89.8 kg (197 lb 14.4 oz)    Admission Cmt:  None   Principal Problem:  None                Active Insurance as of 2020     Primary Coverage     Payor Plan Insurance Group Employer/Plan Group    [employer] [id number]     Payor Plan Address Payor Plan Phone Number Payor Plan Fax Number Effective Dates    [address] [phone]  2019 - None Entered    [facility] [id number]       Subscriber Name Subscriber Birth Date Member ID       ARVIND ROSAS 1955 [id number]                 Emergency Contacts      (Rel.) Home Phone Work Phone Mobile Phone    SHAUN ROSAS (Son) [phone] -- --               History & Physical      Virginia Dickerson DO at 20 1906              [facility] HOSPITALIST   HISTORY AND PHYSICAL      Name:  Arivnd Rossa   Age:  64 y.o.  Sex:  male  :  1955  MRN:  [id number]   Visit Number:  [id number]  Admission Date:  2020  Date Of Service:  20  Primary Care Physician:  Miguelito James MD    Chief Complaint: Shortness of breath with palpitations        History Of Presenting Illness: The patient is a 64-year-old gentleman with chronic alcohol dependency, " hypertension.  The patient admits to a history of recent rehabilitation at [facility] for alcohol withdrawal but is back home and still drinking beer a couple a day.  He is also noncompliant with his blood pressure medications.  He felt like he was going to pass out but did not pass out at home.  He presented to the emergency room after having 3 to 4 days of progressively worsening shortness of breath and palpitations.  He states that he felt like his heart was running away from him.  He also reported having about 1 month duration of intermittent dizzy spells.    He presented to the emergency room with vital signs 97.6 temp, heart rate 116, respirations 22, blood pressure initially 158/108 satting 98% on room air.  His first EKG look like broad complex tachycardia and Dr. Delgadillo was consulted emergently.  The patient later had sinus 146 rhythm and given 2 doses of IV metoprolol 5 mg.  The patient had a preliminary echo done with an EF of 65%.  He then is found to have atrial bradycardia at rate 39.  The emergency room contacted Dr. Delgadillo.  The patient is going to be admitted to the hospitalist service for further evaluation and treatment.    Of note, extra labs were obtained as well.  His glucose was high at 135 but his hemoglobin A1c is only 5.5.  He also has some mild acidosis.  His alcohol level was high at 25.  A chest x-ray was obtained with preliminary no acute abnormalities.  In the ER he was given aspirin 324 mg, 2 mg of magnesium, and 10 mg of IV metoprolol. He states he already drank 2 beers today and normally drinks 4 beers per day. Up until one month ago he states he drank 15 beers per day. Daughter came in after history and states he has chronic tremors and anxiety.         Review Of Systems:     General ROS: Patient denies any fevers, chills or loss of consciousness.  Denies generalized weakness.   Psychological ROS: Denies any hallucinations and delusions.  Ophthalmic ROS: Denies any diplopia or  transient loss of vision.  ENT ROS: Denies sore throat, nasal congestion or ear pain.   Allergy and Immunology ROS: Denies rash or itching.  Hematological and Lymphatic ROS: Denies neck swelling or easy bleeding.  Endocrine ROS: Denies any recent unintentional weight gain or loss.  Breast ROS: Denies any pain or swelling.  Respiratory ROS: Denies cough and does have palpitations with shortness of breath.   Cardiovascular ROS: Denies chest pain and does have intermittent palpitations and heart racing. No history of exertional chest pain.  Gastrointestinal ROS: Positive nausea without  vomiting. Occasional epigastric mild  abdominal pain when he drinks alcohol. No diarrhea.  Genito-Urinary ROS: Denies dysuria or hematuria.  Musculoskeletal ROS: Complains of chronic back pain. No muscle pain. No calf pain.   Neurological ROS: Intermittent dizziness. No loss of consciousness. Denies any numbness. Denies neck pain.   Dermatological ROS: Denies any redness or pruritis.       Past Medical History: Chronic essential hypertension, medication noncompliance, chronic alcohol dependency, anxiety, history of alcoholic hepatitis  Past Medical History:   Diagnosis Date   • Hypertension        Past Surgical history: Reviewed  History reviewed. No pertinent surgical history.    Social History: He chews tobacco.  He denies smoking.  He denies drug use.  He has a history of alcohol dependency and alcohol detox.  He continues to drink a few beers per day by report.  Pediatric History   Patient Guardian Status   • Not on file     Other Topics Concern   • Not on file   Social History Narrative   • Not on file       Family History: Reviewed    History reviewed. No pertinent family history.    Allergies: Reviewed    Patient has no known allergies.    Home Medications:    Prior to Admission Medications     None             ED Medications:    Medications   sodium chloride 0.9 % flush 10 mL (has no administration in time range)   aspirin  chewable tablet 324 mg (324 mg Oral Given 2/1/20 1655)   magnesium sulfate 2g/50 mL (PREMIX) infusion (0 g Intravenous Stopped 2/1/20 1742)   metoprolol tartrate (LOPRESSOR) injection 5 mg (5 mg Intravenous Given 2/1/20 1723)   metoprolol tartrate (LOPRESSOR) injection 5 mg (5 mg Intravenous Given 2/1/20 1728)       Vital Signs:    Temp:  [97.6 °F (36.4 °C)] 97.6 °F (36.4 °C)  Heart Rate:  [] 43  Resp:  [22] 22  BP: (118-158)/() 132/83    No intake or output data in the 24 hours ending 02/01/20 1906        02/01/20  1644   Weight: 94.5 kg (208 lb 6.4 oz)       Body mass index is 29.07 kg/m².    Physical Exam:      General Appearance:    Long black hair and wearing his hat. Alert and cooperative, no acute distress, oriented x 3   Head:    Atraumatic and normocephalic, without obvious defect.   Eyes:            PERRLA, conjunctivae and sclerae normal, no icterus. No pallor. Extraocular movements are within normal limits.   Ears:    Ears appear intact with no abnormalities noted.   Throat:   No oral lesions, no thrush, oral mucosa dry   Neck:   Supple, trachea midline, no thyromegaly   Back:     No kyphoscoliosis present. No tenderness to palpation,   range of motion normal.   Lungs:     Chest shape is normal. Breath sounds heard bilaterally equally.  No crackles or wheezing. No Pleural rub or bronchial breathing.      Heart:    Normal S1 and S2, no murmur, no gallop, no rub. No JVD   Abdomen:     Normal bowel sounds, no masses, no organomegaly. Soft        non-tender, non-distended, no guarding, no rebound                tenderness   Extremities:   Moves all extremities well, no edema, no cyanosis, no             clubbing   Pulses:   Pulses palpable and equal bilaterally   Skin:   No bleeding, bruising or rash   Lymph nodes:   No palpable adenopathy   Neurologic:   Cranial nerves 2 - 12 grossly intact, sensation intact, Motor power is normal and equal bilaterally.       EKG:    See history    Labs:    Lab  Results (last 24 hours)     Procedure Component Value Units Date/Time    Ethanol [[id number]]  (Abnormal) Collected:  02/01/20 1655    Specimen:  Blood Updated:  02/01/20 1900     Ethanol 25 mg/dL      Ethanol % 0.025 %     Magnesium [[id number]]  (Normal) Collected:  02/01/20 1655    Specimen:  Blood Updated:  02/01/20 1829     Magnesium 2.2 mg/dL     Hemoglobin A1c [[id number]]  (Normal) Collected:  02/01/20 1655    Specimen:  Blood Updated:  02/01/20 1813     Hemoglobin A1C 5.50 %     Narrative:       Hemoglobin A1C Ranges:    Increased Risk for Diabetes  5.7% to 6.4%  Diabetes                     >= 6.5%  Diabetic Goal                < 7.0%    [place] Draw [[id number]] Collected:  02/01/20 1655    Specimen:  Blood Updated:  02/01/20 1800    Narrative:       The following orders were created for panel order [place] Draw.  Procedure                               Abnormality         Status                     ---------                               -----------         ------                     Light Blue Top[[id number]]                                   Final result               Green Top (Gel)[[id number]]                                  Final result               Lavender Top[[id number]]                                     Final result               Gold Top - SST[[id number]]                                   Final result               Green Top (No Gel)[[id number]]                               In process                   Please view results for these tests on the individual orders.    Light Blue Top [[id number]] Collected:  02/01/20 1655    Specimen:  Blood Updated:  02/01/20 1800     Extra Tube hold for add-on     Comment: Auto resulted       Green Top (Gel) [[id number]] Collected:  02/01/20 1655    Specimen:  Blood Updated:  02/01/20 1800     Extra Tube Hold for add-ons.     Comment: Auto resulted.       Lavender Top [[id number]] Collected:  02/01/20 1655    Specimen:  Blood Updated:  02/01/20 1800     Extra Tube hold  for add-on     Comment: Auto resulted       Gold Top - SST [[id number]] Collected:  02/01/20 1655    Specimen:  Blood Updated:  02/01/20 1800     Extra Tube Hold for add-ons.     Comment: Auto resulted.       TSH [[id number]]  (Normal) Collected:  02/01/20 1655    Specimen:  Blood Updated:  02/01/20 1750     TSH 1.870 uIU/mL     T4, Free [[id number]]  (Abnormal) Collected:  02/01/20 1655    Specimen:  Blood Updated:  02/01/20 1750     Free T4 0.79 ng/dL     Narrative:       Results may be falsely increased if patient taking Biotin.      Lipid Panel [[id number]]  (Abnormal) Collected:  02/01/20 1655    Specimen:  Blood Updated:  02/01/20 1740     Total Cholesterol 178 mg/dL      Triglycerides 149 mg/dL      HDL Cholesterol 69 mg/dL      LDL Cholesterol  79 mg/dL      VLDL Cholesterol 29.8 mg/dL      LDL/HDL Ratio 1.15    Narrative:       Cholesterol Reference Ranges  (U.S. Department of Health and Human Services ATP III Classifications)    Desirable          <200 mg/dL  Borderline High    200-239 mg/dL  High Risk          >240 mg/dL      Triglyceride Reference Ranges  (U.S. Department of Health and Human Services ATP III Classifications)    Normal           <150 mg/dL  Borderline High  150-199 mg/dL  High             200-499 mg/dL  Very High        >500 mg/dL    HDL Reference Ranges  (U.S. Department of Health and Human Services ATP III Classifcations)    Low     <40 mg/dl (major risk factor for CHD)  High    >60 mg/dl ('negative' risk factor for CHD)        LDL Reference Ranges  (U.S. Department of Health and Human Services ATP III Classifcations)    Optimal          <100 mg/dL  Near Optimal     100-129 mg/dL  Borderline High  130-159 mg/dL  High             160-189 mg/dL  Very High        >189 mg/dL    Comprehensive Metabolic Panel [[id number]]  (Abnormal) Collected:  02/01/20 1655    Specimen:  Blood Updated:  02/01/20 1720     Glucose 135 mg/dL      BUN 14 mg/dL      Creatinine 0.84 mg/dL      Sodium 140 mmol/L       Potassium 4.1 mmol/L      Chloride 102 mmol/L      CO2 21.3 mmol/L      Calcium 10.0 mg/dL      Total Protein 8.8 g/dL      Albumin 4.90 g/dL      ALT (SGPT) 28 U/L      AST (SGOT) 33 U/L      Alkaline Phosphatase 91 U/L      Total Bilirubin 0.4 mg/dL      eGFR Non African Amer 93 mL/min/1.73      eGFR  African Amer 112 mL/min/1.73      Globulin 3.9 gm/dL      A/G Ratio 1.3 g/dL      BUN/Creatinine Ratio 16.7     Anion Gap 16.7 mmol/L     Narrative:       GFR Normal >60  Chronic Kidney Disease <60  Kidney Failure <15      Troponin [[id number]]  (Normal) Collected:  02/01/20 1655    Specimen:  Blood Updated:  02/01/20 1720     Troponin T <0.010 ng/mL     Narrative:       Troponin T Reference Range:  <= 0.03 ng/mL-   Negative for AMI  >0.03 ng/mL-     Abnormal for myocardial necrosis.  Clinicians would have to utilize clinical acumen, EKG, Troponin and serial changes to determine if it is an Acute Myocardial Infarction or myocardial injury due to an underlying chronic condition.       Results may be falsely decreased if patient taking Biotin.      Troponin I-Stat [[id number]]  (Normal) Collected:  02/01/20 1655    Specimen:  Blood Updated:  02/01/20 1710     Troponin I 0.010 ng/mL     Narrative:       Normal Patient Upper Reference Limit (URL) (99th Percentile)=0.05 ng/mL   Non-AMI Illness Reference Limit=0.05-0.11 ng/mL   AMI Confirmation=0.12 ng/mL and above    CBC & Differential [[id number]] Collected:  02/01/20 1655    Specimen:  Blood Updated:  02/01/20 1704    Narrative:       The following orders were created for panel order CBC & Differential.  Procedure                               Abnormality         Status                     ---------                               -----------         ------                     CBC Auto Differential[[id number]]        Normal              Final result                 Please view results for these tests on the individual orders.    CBC Auto Differential [[id number]]  (Normal)  Collected:  02/01/20 1655    Specimen:  Blood Updated:  02/01/20 1704     WBC 10.43 10*3/mm3      RBC 5.05 10*6/mm3      Hemoglobin 16.1 g/dL      Hematocrit 47.0 %      MCV 93.1 fL      MCH 31.9 pg      MCHC 34.3 g/dL      RDW 14.2 %      RDW-SD 48.6 fl      MPV 9.3 fL      Platelets 345 10*3/mm3      Neutrophil % 63.9 %      Lymphocyte % 25.3 %      Monocyte % 7.8 %      Eosinophil % 1.7 %      Basophil % 0.9 %      Immature Grans % 0.4 %      Neutrophils, Absolute 6.67 10*3/mm3      Lymphocytes, Absolute 2.64 10*3/mm3      Monocytes, Absolute 0.81 10*3/mm3      Eosinophils, Absolute 0.18 10*3/mm3      Basophils, Absolute 0.09 10*3/mm3      Immature Grans, Absolute 0.04 10*3/mm3      nRBC 0.0 /100 WBC     Green Top (No Gel) [[id number]] Collected:  02/01/20 1655    Specimen:  Blood Updated:  02/01/20 1658          Radiology:    Imaging Results (Last 72 Hours)     Procedure Component Value Units Date/Time    XR Chest 1 View [[id number]] Resulted:  02/01/20 1813     Updated:  02/01/20 1813          Assessment:    Palpitations    1.  Bradycardia, with likely tachybradycardia syndrome ; with prior broad complex tachycardia on initial presentation; after drinking alcohol and drinking 5 hour energy drinks daily  2.  Chronic alcohol dependency with positive high alcohol level on admission  3.  Chronic essential hypertension with medication noncompliance  4.  Medication noncompliance  5.  Chronic tobacco dependency/chewing tobacco  6.  Failure to seek routine medical care  7.  Mild epigastric discomfort while drinking alcohol, prior to admission, with likely chronic gastritis  8.  History of alcohol related hepatitis    Plan:    The patient will be admitted to the ICU to the hospitalist service with consultation provided by Dr. Delgadillo.   he will be provided IV fluids.  Check CIWA scoring and provide Ativan as needed for alcohol withdrawal.  He does not currently appear to be in alcohol withdrawal.  He is already been given  magnesium.  Continue oral vitamin supplementation.  His blood pressure is better after metoprolol so hold off on any other medication just yet.  Continue to monitor daily labs and titrate electrolytes and medications accordingly.  A formal 2D echo will be needed tomorrow. He was recommended to stop drinking energy drinks.    He requested anxiety medication, so librium provided.   He requested medication for stomach aching/discomfort. He agreed to avoid further alcohol. He was started on Pepcid and sucralfate.   Tobacco cessation counseling provided.  Nicotine patch refused.  Alcohol cessation counseling   Provided.    Medication risks and benefits were discussed in detail. Patient reported being satisfied with current treatment plan.    Virginia Dickerson DO  02/01/20  7:06 PM    Addendum: Patient called me back to bedside due to anxiety. Reassurance provided. Also, he requested phone number to [facility] [place] operated by [facility] clinic for [facility] physician.      Electronically signed by Virginia Dickerson DO at 02/02/20 0650          Emergency Department Notes      Alexa Carlton at 02/01/20 1658         contacted and transferred to  at this time.      Alexa Carlton  02/01/20 1658      Electronically signed by Alexa Carlton at 02/01/20 1658     Alexa Carlton at 02/01/20 1708        Requested Medical Records from [facility] for Dr. Mittal at 1709.     Alexa Carlton  02/01/20 1709      Electronically signed by Alexa Carlton at 02/01/20 1709     Alexa Carlton at 02/01/20 1730        Contacted [facility] again requesting past EKG STAT at 1730.     Alexa Carlton  02/01/20 1731      Electronically signed by Alexa Carlton at 02/01/20 1731     Alexa Marr RN at 02/01/20 1742        Dr. Delgadillo at  as well as Dr. Mittal. Both aware of pts bradycardia. Will con't to monitor closely and await further orders.      Alexa Marr RN  02/01/20 1745      Electronically signed by Tejinder  Alexa HOSKINS RN at 02/01/20 1743     Francisco Mittal DO at 02/01/20 1813          Subjective   History of Present Illness    Chief Complaint: Shortness of breath, chest pain, general malaise  History of Present Illness: 64-year-old male presents with shortness of breath and lightheadedness began yesterday was associated with chest pain which has now resolved.  History of alcohol abuse, was at [facility] approximately 6 weeks ago.  Onset: Yesterday  Duration: Persistent  Exacerbating / Alleviating factors: None  Associated symptoms: None      Nurses Notes reviewed and agree, including vitals, allergies, social history and prior medical history.     REVIEW OF SYSTEMS: All systems reviewed and not pertinent unless noted.    Positive for: Resolved chest pain, shortness of breath, lightheadedness, racing heartbeat    Negative for: Hemoptysis syncope  Review of Systems    Past Medical History:   Diagnosis Date   • Hypertension        No Known Allergies    History reviewed. No pertinent surgical history.    History reviewed. No pertinent family history.    Social History     Socioeconomic History   • Marital status: Unknown     Spouse name: Not on file   • Number of children: Not on file   • Years of education: Not on file   • Highest education level: Not on file   Tobacco Use   • Smoking status: Never Smoker   Substance and Sexual Activity   • Alcohol use: Yes     Comment: ETOH REHAB  6 WEEKS AGO; DRINKS A FEW A WEEK NOW   • Drug use: Never           Objective   Physical Exam      GENERAL APPEARANCE: Well developed, well nourished, in no acute distress.  VITAL SIGNS: per nursing, reviewed and noted  SKIN: no rashes, ulcerations or petechiae.  Head: Normocephalic, atraumatic.   EYES: perrla. EOMI.  ENT:  TM clear, posterior pharynx patent.  LUNGS:  normal breath sounds. No retractions.   CARDIOVASCULAR: Regular rhythm, tachycardic rate approximately 115, no murmurs.  Good Peripheral pulses.  ABDOMEN: Soft, nontender, normal  bowel sounds. No hernia. No ascites.  MUSCULOSKELETAL:  No tenderness. Full ROM. Strength and tone normal.  NEUROLOGIC: Alert, oriented x 3. No gross deficits.   NECK: Supple, symmetric. No tenderness, no masses. Full ROM  Back: full rom, no paraspinal spasm. No CVA tenderness.   PSYCH: appropriate affect,  : no bladder tenderness or distention, no CVA tenderness      Procedures    Procedure none      ED Course  ED Course as of Feb 01 1826   Sat Feb 01, 2020 1812 Chest x-ray interpreted by me shows no evidence of any cardiomegaly, effusion, infiltrate, or bony abnormality.    [PF]   1813 TSH Baseline: 1.870 [PF]   1813 Troponin T: <0.010 [PF]   1813 Free T4(!): 0.79 [PF]   1813 Total Cholesterol: 178 [PF]   1813 Triglycerides: 149 [PF]   1813 HDL Cholesterol(!): 69 [PF]   1813 LDL Cholesterol : 79 [PF]   1813 VLDL Cholesterol: 29.8 [PF]   1813 LDL/HDL Ratio: 1.15 [PF]   1813 Troponin I: 0.010 [PF]   1813 WBC: 10.43 [PF]   1813 Hemoglobin: 16.1 [PF]   1813 Hematocrit: 47.0 [PF]   1813 Platelets: 345 [PF]   1813 Glucose(!): 135 [PF]   1813 BUN: 14 [PF]   1813 Creatinine: 0.84 [PF]   1813 Sodium: 140 [PF]   1813 Potassium: 4.1 [PF]   1813 Chloride: 102 [PF]   1813 CO2(!): 21.3 [PF]   1813 Calcium: 10.0 [PF]   1813 Total Protein(!): 8.8 [PF]   1813 Albumin: 4.90 [PF]   1813 ALT (SGPT): 28 [PF]   1813 AST (SGOT): 33 [PF]   1813 Alkaline Phosphatase: 91 [PF]   1813 Total Bilirubin: 0.4 [PF]   1813 eGFR Non  Am: 93 [PF]   1813 eGFR  Am: 112 [PF]      ED Course User Index  [PF] Francisco Mittal DO      Cussed with Dr. Delgadillo, cardiologist, on patient arrival who evaluated EKG which revealed intraventricular conduction block at a rate of 117.  ST elevation in lead aVR only.  No old EKG for comparison abnormal EKG  Dr. Delgadillo completed bedside echo which revealed no acute findings per Dr. Delgadillo.  Dr. Delgadillo recommended magnesium and Lopressor which converted the patient to a sinus bradycardia at a rate of  39.    Given the potential for tachybradycardia syndrome patient's fluctuating heart rates, Dr. Delgadillo recommended admission to hospitalist service to the ICU.  Will follow.    Discussed with Dr. Dickerson hospitalist service, will admit to ICU.    Note patient is not showing any signs of alcohol withdrawal at this time.                                         MDM  Number of Diagnoses or Management Options  Chest pain, unspecified type:   History of ETOH abuse:   Palpitations:      Amount and/or Complexity of Data Reviewed  Clinical lab tests: reviewed and ordered  Tests in the radiology section of CPT®:  reviewed and ordered  Tests in the medicine section of CPT®:  ordered and reviewed  Discussion of test results with the performing providers: yes  Decide to obtain previous medical records or to obtain history from someone other than the patient: yes  Obtain history from someone other than the patient: yes  Review and summarize past medical records: yes  Discuss the patient with other providers: yes  Independent visualization of images, tracings, or specimens: yes    Risk of Complications, Morbidity, and/or Mortality  Presenting problems: high  Diagnostic procedures: high  Management options: moderate    Critical Care  Total time providing critical care: 30-74 minutes    Patient Progress  Patient progress: stable      Final diagnoses:   Palpitations   Chest pain, unspecified type   History of ETOH abuse            Francisco Mittal DO  02/01/20 1826      Electronically signed by Francisco Mittal DO at 02/01/20 1826     Alexa Carlton at 02/01/20 1816        Contacted Dr. Dickerson for Dr. Mittal at 1816.     Alexa Carlton  02/01/20 1816      Electronically signed by Alexa Carlton at 02/01/20 1816     Alexa Carlton at 02/01/20 1826        Nayan Vazquez, assigned ICU 1 at 1826. DEEJAY Liu, notified.      Alexa Carlton  02/01/20 1826       Alexa Carlton  02/01/20 1828      Electronically signed by Alexa Carlton at 02/01/20  "1828          Physician Progress Notes (last 24 hours) (Notes from 02/01/20 1137 through 02/02/20 1137)      Lionel Delgadillo MD at 02/02/20 0748             LOS: 1 day   No care team member to display        Interval History:     Patient feels reasonable had no new symptoms at this time.  Has been going through what appears to be withdrawal from alcohol.  Admits that he has been drinking more than what he admitted to yesterday.    Review of Systems:   Pertinent items are noted in HPI.      OBJECTIVE:    Vital Sign Min/Max for last 24 hours  Temp  Min: 97.1 °F (36.2 °C)  Max: 98.3 °F (36.8 °C)   BP  Min: 85/55  Max: 158/108   Pulse  Min: 39  Max: 116   Resp  Min: 13  Max: 22   SpO2  Min: 93 %  Max: 100 %   Flow (L/min)  Min: 2  Max: 2   Weight  Min: 89.8 kg (197 lb 14.4 oz)  Max: 94.5 kg (208 lb 6.4 oz)     Flowsheet Rows      First Filed Value   Admission Height  180.3 cm (71\") Documented at 02/01/2020 1644   Admission Weight  94.5 kg (208 lb 6.4 oz) Documented at 02/01/2020 1644          Physical Exam:     General Appearance:    Alert, cooperative, in no acute distress   Head:    Normocephalic, without obvious abnormality, atraumatic   Eyes:            Lids and lashes normal, conjunctivae and sclerae normal, no   icterus, no pallor, corneas clear, PERRLA   Ears:    Ears appear intact with no abnormalities noted   Throat:   No oral lesions, no thrush, oral mucosa moist   Neck:   No adenopathy, supple, trachea midline, no thyromegaly, no   carotid bruit, no JVD   Back:     No kyphosis present, no scoliosis present, no skin lesions,      erythema or scars, no tenderness to percussion or                   palpation,   range of motion normal   Lungs:     Clear to auscultation,respirations regular, even and                  unlabored    Heart:    Regular rhythm and normal rate, normal S1 and S2, no            murmur, no gallop, no rub, no click   Chest Wall:    No abnormalities observed   Abdomen:     Normal " bowel sounds, no masses, no organomegaly, soft        non-tender, non-distended, no guarding, no rebound                tenderness   Rectal:     Deferred   Extremities:   Moves all extremities well, no edema, no cyanosis, no             redness   Pulses:   Pulses palpable and equal bilaterally   Skin:   No bleeding, bruising or rash   Lymph nodes:   No palpable adenopathy   Neurologic:   Cranial nerves 2 - 12 grossly intact, sensation intact, DTR       present and equal bilaterally        Results Review:     I reviewed the patient's new clinical results.        Telemetry: Runs of broad complex tachycardia with rates in the  beats a minute sustained noted baseline rates in the 50s and 60s..     EKG: Sinus rhythm left ventricular hypertrophy early repolarization changes.    Echocardiogram-mild LVH normal LV systolic function moderate left atrial enlargement.  Mild AI and MR.    LAB DATA :     Results from last 7 days   Lab Units 02/01/20  1655   CHOLESTEROL mg/dL 178   TRIGLYCERIDES mg/dL 149   HDL CHOL mg/dL 69*   LDL CHOL mg/dL 79       Laboratory results:    Results from last 7 days   Lab Units 02/02/20 0412 02/01/20  1655   SODIUM mmol/L 140 140   POTASSIUM mmol/L 4.0 4.1   CHLORIDE mmol/L 104 102   CO2 mmol/L 22.6 21.3*   BUN mg/dL 15 14   CREATININE mg/dL 0.79 0.84   CALCIUM mg/dL 9.0 10.0   BILIRUBIN mg/dL  --  0.4   ALK PHOS U/L  --  91   ALT (SGPT) U/L  --  28   AST (SGOT) U/L  --  33   GLUCOSE mg/dL 95 135*     Results from last 7 days   Lab Units 02/02/20 0412 02/01/20  1655   WBC 10*3/mm3 6.83 10.43   HEMOGLOBIN g/dL 14.2 16.1   HEMATOCRIT % 42.1 47.0   PLATELETS 10*3/mm3 277 345     Results from last 7 days   Lab Units 02/01/20  1655   INR  1.02             Results from last 7 days   Lab Units 02/02/20 0412 02/01/20  1655   TROPONIN I ng/mL  --   --  0.010   TROPONIN T ng/mL <0.010   < > <0.010    < > = values in this interval not displayed.                 Lab Results   Component Value Date     HGBA1C 5.50 02/01/2020     Results from last 7 days   Lab Units 02/01/20  1655   TSH uIU/mL 1.870   FREE T4 ng/dL 0.79*           IMAGING DATA:     No results found.      ASSESSMENT PLAN:     #1 broad complex tachycardia:  Patient has been having episodes of broad complex tachycardia that has occurred after the beta-blocker effect has worn out last night also.  The rate appears to be in the 9210 bpm range.  Will put him on low-dose beta-blocker therapy.  The fact that his LV function is normal is very reassuring.  His enzymes are also negative.  Further work-up will be pursued on outpatient basis.  Will call him back with an appointment to return to see us in a few weeks.    2.  Coronary artery disease:  Has a reasonable risk profile for the same will need further evaluation for this.  Will be arranged on outpatient basis.    3.  Hypertension:   Blood pressures seem to be doing well through the night.  Will follow through with this on outpatient basis and decide on therapy options.  We will start him on low-dose ACE inhibitor therapy.    Thank you for letting me take part in the care of Mr. Vinson.        Palpitations    Tachy-saad syndrome (CMS/HCC)    Chronic alcohol dependence, continuous (CMS/HCC)    Essential hypertension            Lionel Delgadillo MD  02/02/20  7:48 AM          Electronically signed by Lionel Delgadillo MD at 02/02/20 0750          Consult Notes (last 24 hours) (Notes from 02/01/20 1137 through 02/02/20 1137)      Lionel Delgadillo MD at 02/01/20 1755            Referring Provider: Dr Mittal  Reason for Consultation: V tach     Patient Care Team:  Miguelito James MD as PCP - General        History of present illness:     Middle aged gentleman who has been having shortness of breath for the last 3 to 4 days time.  In the last 1 day or so he feels like is going to pass out and has not been able to do any form of activity he feels like his heart is running away on him.   He has reported to the emergency room where he has been noted to be in broad complex tachycardia.    Patient does not admit any prior work-up with respect to the heart.  Has done an EKG at [facility] 1 month ago.  He does not take his blood pressure medications on a regular basis if he feels his blood pressure is higher he pops in anybody else his blood pressure medication.    Does admit to recurrent dizzy spells on and off maybe once a month or so.  It lasts a brief second and it goes away.  Has not actually passed out at any time.    Review of Systems   Pertinent items are noted in HPI  Review of Systems      History    Baseline EKG sinus rhythm NH interval of 146 ms LVH nonspecific ST wave changes early repolarization changes.    LV function : EF 65% with mild LVH echo bedside 2/20     CAD : no work up personal history:  .    Htn : non compliant with meds     Dyslipidemia     Alcohol issues : used to be a alcoholic - has been thru rehab and now driniks some     Personal history    Used to be a alcoholic has been through rehab.  Does not admit to smoking does chew tobacco  Family history:    Noncontributory.    Review of symptoms:    Has been having shortness of breath.  There is no leg swelling there is no PND or orthopnea no stroke weakness no joint swelling rest of the review symptoms are negative.    History reviewed. No pertinent surgical history., History reviewed. No pertinent family history., Social History     Tobacco Use   • Smoking status: Never Smoker   Substance Use Topics   • Alcohol use: Yes     Comment: ETOH REHAB  6 WEEKS AGO; DRINKS A FEW A WEEK NOW   • Drug use: Never   ,   (Not in a hospital admission), Scheduled Meds:   , Continuous Infusions:   , PRN Meds:  •  sodium chloride, Allergies:  Patient has no known allergies.     OBJECTIVE:    Vital Sign Min/Max for last 24 hours  Temp  Min: 97.6 °F (36.4 °C)  Max: 97.6 °F (36.4 °C)   BP  Min: 123/88  Max: 158/108   Pulse  Min: 39  Max: 116  "  Resp  Min: 22  Max: 22   SpO2  Min: 96 %  Max: 98 %   No data recorded   Weight  Min: 94.5 kg (208 lb 6.4 oz)  Max: 94.5 kg (208 lb 6.4 oz)     Flowsheet Rows      First Filed Value   Admission Height  180.3 cm (71\") Documented at 02/01/2020 1644   Admission Weight  94.5 kg (208 lb 6.4 oz) Documented at 02/01/2020 1644               Physical Exam:     General Appearance:    Alert, cooperative, in no acute distress   Head:    Normocephalic, without obvious abnormality, atraumatic   Eyes:            Lids and lashes normal, conjunctivae and sclerae normal, no   icterus, no pallor, corneas clear, PERRLA   Ears:    Ears appear intact with no abnormalities noted   Throat:   No oral lesions, no thrush, oral mucosa moist   Neck:   No adenopathy, supple, trachea midline, no thyromegaly, no   carotid bruit, no JVD   Back:     No kyphosis present, no scoliosis present, no skin lesions,      erythema or scars, no tenderness to percussion or                   palpation,   range of motion normal   Lungs:     Clear to auscultation,respirations regular, even and                  unlabored    Heart:    Regular rhythm and normal rate, normal S1 and S2, no            murmur, no gallop, no rub, no click   Chest Wall:    No abnormalities observed   Abdomen:     Normal bowel sounds, no masses, no organomegaly, soft        non-tender, non-distended, no guarding, no rebound                tenderness   Rectal:     Deferred   Extremities:   Moves all extremities well, no edema, no cyanosis, no             redness   Pulses:   Pulses palpable and equal bilaterally   Skin:   No bleeding, bruising or rash   Lymph nodes:   No palpable adenopathy   Neurologic:   Cranial nerves 2 - 12 grossly intact, sensation intact, DTR       present and equal bilaterally       Results Review:   I reviewed the patient's new clinical results.      EKG: Broad complex tachycardia.    EKG 2: Sinus rhythm IL interval of 146 ms LVH with early repolarization changes.  " #1 broad complex tachycardia:    LAB DATA :     Results from last 7 days   Lab Units 02/01/20  1655   CHOLESTEROL mg/dL 178   TRIGLYCERIDES mg/dL 149   HDL CHOL mg/dL 69*   LDL CHOL mg/dL 79       WBC   Date Value Ref Range Status   02/01/2020 10.43 3.40 - 10.80 10*3/mm3 Final     RBC   Date Value Ref Range Status   02/01/2020 5.05 4.14 - 5.80 10*6/mm3 Final     Hemoglobin   Date Value Ref Range Status   02/01/2020 16.1 13.0 - 17.7 g/dL Final     Hematocrit   Date Value Ref Range Status   02/01/2020 47.0 37.5 - 51.0 % Final     MCV   Date Value Ref Range Status   02/01/2020 93.1 79.0 - 97.0 fL Final     MCH   Date Value Ref Range Status   02/01/2020 31.9 26.6 - 33.0 pg Final     MCHC   Date Value Ref Range Status   02/01/2020 34.3 31.5 - 35.7 g/dL Final     RDW   Date Value Ref Range Status   02/01/2020 14.2 12.3 - 15.4 % Final     RDW-SD   Date Value Ref Range Status   02/01/2020 48.6 37.0 - 54.0 fl Final     MPV   Date Value Ref Range Status   02/01/2020 9.3 6.0 - 12.0 fL Final     Platelets   Date Value Ref Range Status   02/01/2020 345 140 - 450 10*3/mm3 Final     Neutrophil %   Date Value Ref Range Status   02/01/2020 63.9 42.7 - 76.0 % Final     Lymphocyte %   Date Value Ref Range Status   02/01/2020 25.3 19.6 - 45.3 % Final     Monocyte %   Date Value Ref Range Status   02/01/2020 7.8 5.0 - 12.0 % Final     Eosinophil %   Date Value Ref Range Status   02/01/2020 1.7 0.3 - 6.2 % Final     Basophil %   Date Value Ref Range Status   02/01/2020 0.9 0.0 - 1.5 % Final     Immature Grans %   Date Value Ref Range Status   02/01/2020 0.4 0.0 - 0.5 % Final     Neutrophils, Absolute   Date Value Ref Range Status   02/01/2020 6.67 1.70 - 7.00 10*3/mm3 Final     Lymphocytes, Absolute   Date Value Ref Range Status   02/01/2020 2.64 0.70 - 3.10 10*3/mm3 Final     Monocytes, Absolute   Date Value Ref Range Status   02/01/2020 0.81 0.10 - 0.90 10*3/mm3 Final     Eosinophils, Absolute   Date Value Ref Range Status   02/01/2020  0.18 0.00 - 0.40 10*3/mm3 Final     Basophils, Absolute   Date Value Ref Range Status   02/01/2020 0.09 0.00 - 0.20 10*3/mm3 Final     Immature Grans, Absolute   Date Value Ref Range Status   02/01/2020 0.04 0.00 - 0.05 10*3/mm3 Final     nRBC   Date Value Ref Range Status   02/01/2020 0.0 0.0 - 0.2 /100 WBC Final       Lab Results   Component Value Date    GLUCOSE 135 (H) 02/01/2020    BUN 14 02/01/2020    CREATININE 0.84 02/01/2020    EGFRIFNONA 93 02/01/2020    EGFRIFAFRI 112 02/01/2020    BCR 16.7 02/01/2020    CO2 21.3 (L) 02/01/2020    CALCIUM 10.0 02/01/2020    ALBUMIN 4.90 02/01/2020    AST 33 02/01/2020    ALT 28 02/01/2020       Lab Results   Component Value Date    TROPONINI 0.010 02/01/2020    TROPONINT <0.010 02/01/2020       No results found for: DDIMER    No results found for: SITE, ALLENTEST, PHART, FNC1MAB, PO2ART, ZHB0LNV, BASEEXCESS, W9KBBFJA, HGBBG, HCTABG, OXYHEMOGLOBI, METHHGBN, CARBOXYHGB, CO2CT, BAROMETRIC, MODALITY, FIO2  No results found for: HGBA1C  Results from last 7 days   Lab Units 02/01/20  1655   TSH uIU/mL 1.870   FREE T4 ng/dL 0.79*     No results found for: LIPASE    IMAGING DATA:     No results found.      DIAGNOSIS     #1 broad complex tachycardia:    Patient's initial presentation has been broad complex tachycardia with rates in the 1 20-1 30 beats a minute has been symptomatic with the same.  Bedside echocardiogram during the tachycardia reveals normal LV systolic function.  His initial set of enzymes have been negative though his symptoms have been going on for 2 to 3 days time.  Appears to be a primary rhythm issue.  Patient received 2 g of magnesium and 10 mg of metoprolol with conversion to sinus rhythm with a 4-second pause.  Since then is been noted to be bradycardic.  We will continue to monitor and obtain all the metabolic and structural work-up prior to making any further decisions.    2.  Hypertension:  Patient has been noncompliant with his medications.  We will  continue to monitor this.  After the metoprolol his blood pressure seems to be under good control.    3.  LV function assessment:  Bedside echocardiogram reveals normal LV systolic function during his tachycardia.  We will obtain a official 2D echocardiogram in a.m.    4.  Coronary artery disease:  Has a high risk profile for the same nevertheless his enzymes have been negative actively he has no chest pains right now.  EKG does not show any ST elevation.  We will continue to get serial cardiac enzymes and rule him out for myocardial infarction.    5.  Alcohol intake.  The story seems to be changing.  Patient has had alcohol problems in the past now admits that  he is drinking a few beers every day.  Will request the hospitalist to follow the patient.    6.  Risk profile:  Will be evaluated for the same during the hospital stay and therapy adjusted accordingly.  He has been having recurrent dizzy spells.  May need further work-up with respect to his arrhythmia      #7 dizzy spells:  Based on the findings of the echocardiogram his metabolic work-up and his CAD work-up will decide on further course of evaluation and therapy.        * No active hospital problems. *          I discussed the patients findings and my recommendations with patient    Lionel Delgadillo MD  02/01/20  5:56 PM      Please note that portions of this note may have been completed with a voice recognition program. Efforts were made to edit the dictations, but occasionally words are mistranscribed.             Electronically signed by Lionel Delgadillo MD at 02/01/20 7758

## 2020-02-02 NOTE — PROGRESS NOTES
Case Management Discharge Note      Final Note:  Dischrged home prior to DCP          Destination      No service has been selected for the patient.      Durable Medical Equipment      No service has been selected for the patient.      Dialysis/Infusion      No service has been selected for the patient.      Home Medical Care      No service has been selected for the patient.      Therapy      No service has been selected for the patient.      Community Resources      No service has been selected for the patient.        Transportation Services  Private: Car    Final Discharge Disposition Code: 01 - home or self-care

## 2020-02-02 NOTE — H&P
[facility]      Name:  Arvind Vinson   Age:  64 y.o.  Sex:  male  :  1955  MRN:  [id number]   Visit Number:  [id number]  Admission Date:  2020  Date Of Service:  20  Primary Care Physician:  Miguelito James MD    Chief Complaint: Shortness of breath with palpitations        History Of Presenting Illness: The patient is a 64-year-old gentleman with chronic alcohol dependency, hypertension.  The patient admits to a history of recent rehabilitation at [facility] for alcohol withdrawal but is back home and still drinking beer a couple a day.  He is also noncompliant with his blood pressure medications.  He felt like he was going to pass out but did not pass out at home.  He presented to the emergency room after having 3 to 4 days of progressively worsening shortness of breath and palpitations.  He states that he felt like his heart was running away from him.  He also reported having about 1 month duration of intermittent dizzy spells.    He presented to the emergency room with vital signs 97.6 temp, heart rate 116, respirations 22, blood pressure initially 158/108 satting 98% on room air.  His first EKG look like broad complex tachycardia and Dr. Delgadillo was consulted emergently.  The patient later had sinus 146 rhythm and given 2 doses of IV metoprolol 5 mg.  The patient had a preliminary echo done with an EF of 65%.  He then is found to have atrial bradycardia at rate 39.  The emergency room contacted Dr. Delgadillo.  The patient is going to be admitted to the hospitalist service for further evaluation and treatment.    Of note, extra labs were obtained as well.  His glucose was high at 135 but his hemoglobin A1c is only 5.5.  He also has some mild acidosis.  His alcohol level was high at 25.  A chest x-ray was obtained with preliminary no acute abnormalities.  In the ER he was given aspirin 324 mg, 2 mg of magnesium, and 10 mg of IV metoprolol. He  states he already drank 2 beers today and normally drinks 4 beers per day. Up until one month ago he states he drank 15 beers per day. Daughter came in after history and states he has chronic tremors and anxiety.         Review Of Systems:     General ROS: Patient denies any fevers, chills or loss of consciousness.  Denies generalized weakness.   Psychological ROS: Denies any hallucinations and delusions.  Ophthalmic ROS: Denies any diplopia or transient loss of vision.  ENT ROS: Denies sore throat, nasal congestion or ear pain.   Allergy and Immunology ROS: Denies rash or itching.  Hematological and Lymphatic ROS: Denies neck swelling or easy bleeding.  Endocrine ROS: Denies any recent unintentional weight gain or loss.  Breast ROS: Denies any pain or swelling.  Respiratory ROS: Denies cough and does have palpitations with shortness of breath.   Cardiovascular ROS: Denies chest pain and does have intermittent palpitations and heart racing. No history of exertional chest pain.  Gastrointestinal ROS: Positive nausea without  vomiting. Occasional epigastric mild  abdominal pain when he drinks alcohol. No diarrhea.  Genito-Urinary ROS: Denies dysuria or hematuria.  Musculoskeletal ROS: Complains of chronic back pain. No muscle pain. No calf pain.   Neurological ROS: Intermittent dizziness. No loss of consciousness. Denies any numbness. Denies neck pain.   Dermatological ROS: Denies any redness or pruritis.       Past Medical History: Chronic essential hypertension, medication noncompliance, chronic alcohol dependency, anxiety, history of alcoholic hepatitis  Past Medical History:   Diagnosis Date   • Hypertension        Past Surgical history: Reviewed  History reviewed. No pertinent surgical history.    Social History: He chews tobacco.  He denies smoking.  He denies drug use.  He has a history of alcohol dependency and alcohol detox.  He continues to drink a few beers per day by report.  Pediatric History   Patient  Guardian Status   • Not on file     Other Topics Concern   • Not on file   Social History Narrative   • Not on file       Family History: Reviewed    History reviewed. No pertinent family history.    Allergies: Reviewed    Patient has no known allergies.    Home Medications:    Prior to Admission Medications     None             ED Medications:    Medications   sodium chloride 0.9 % flush 10 mL (has no administration in time range)   aspirin chewable tablet 324 mg (324 mg Oral Given 2/1/20 1655)   magnesium sulfate 2g/50 mL (PREMIX) infusion (0 g Intravenous Stopped 2/1/20 1742)   metoprolol tartrate (LOPRESSOR) injection 5 mg (5 mg Intravenous Given 2/1/20 1723)   metoprolol tartrate (LOPRESSOR) injection 5 mg (5 mg Intravenous Given 2/1/20 1728)       Vital Signs:    Temp:  [97.6 °F (36.4 °C)] 97.6 °F (36.4 °C)  Heart Rate:  [] 43  Resp:  [22] 22  BP: (118-158)/() 132/83    No intake or output data in the 24 hours ending 02/01/20 1906        02/01/20  1644   Weight: 94.5 kg (208 lb 6.4 oz)       Body mass index is 29.07 kg/m².    Physical Exam:      General Appearance:    Long black hair and wearing his hat. Alert and cooperative, no acute distress, oriented x 3   Head:    Atraumatic and normocephalic, without obvious defect.   Eyes:            PERRLA, conjunctivae and sclerae normal, no icterus. No pallor. Extraocular movements are within normal limits.   Ears:    Ears appear intact with no abnormalities noted.   Throat:   No oral lesions, no thrush, oral mucosa dry   Neck:   Supple, trachea midline, no thyromegaly   Back:     No kyphoscoliosis present. No tenderness to palpation,   range of motion normal.   Lungs:     Chest shape is normal. Breath sounds heard bilaterally equally.  No crackles or wheezing. No Pleural rub or bronchial breathing.      Heart:    Normal S1 and S2, no murmur, no gallop, no rub. No JVD   Abdomen:     Normal bowel sounds, no masses, no organomegaly. Soft        non-tender,  non-distended, no guarding, no rebound                tenderness   Extremities:   Moves all extremities well, no edema, no cyanosis, no             clubbing   Pulses:   Pulses palpable and equal bilaterally   Skin:   No bleeding, bruising or rash   Lymph nodes:   No palpable adenopathy   Neurologic:   Cranial nerves 2 - 12 grossly intact, sensation intact, Motor power is normal and equal bilaterally.       EKG:    See history    Labs:    Lab Results (last 24 hours)     Procedure Component Value Units Date/Time    Ethanol [[id number]]  (Abnormal) Collected:  02/01/20 1655    Specimen:  Blood Updated:  02/01/20 1900     Ethanol 25 mg/dL      Ethanol % 0.025 %     Magnesium [[id number]]  (Normal) Collected:  02/01/20 1655    Specimen:  Blood Updated:  02/01/20 1829     Magnesium 2.2 mg/dL     Hemoglobin A1c [[id number]]  (Normal) Collected:  02/01/20 1655    Specimen:  Blood Updated:  02/01/20 1813     Hemoglobin A1C 5.50 %     Narrative:       Hemoglobin A1C Ranges:    Increased Risk for Diabetes  5.7% to 6.4%  Diabetes                     >= 6.5%  Diabetic Goal                < 7.0%    Dixon Draw [[id number]] Collected:  02/01/20 1655    Specimen:  Blood Updated:  02/01/20 1800    Narrative:       The following orders were created for panel order Dixon Draw.  Procedure                               Abnormality         Status                     ---------                               -----------         ------                     Light Blue Top[[id number]]                                   Final result               Green Top (Gel)[[id number]]                                  Final result               Lavender Top[[id number]]                                     Final result               Gold Top - SST[[id number]]                                   Final result               Green Top (No Gel)[[id number]]                               In process                   Please view results for these tests on the individual  orders.    Light Blue Top [[id number]] Collected:  02/01/20 1655    Specimen:  Blood Updated:  02/01/20 1800     Extra Tube hold for add-on     Comment: Auto resulted       Green Top (Gel) [[id number]] Collected:  02/01/20 1655    Specimen:  Blood Updated:  02/01/20 1800     Extra Tube Hold for add-ons.     Comment: Auto resulted.       Lavender Top [[id number]] Collected:  02/01/20 1655    Specimen:  Blood Updated:  02/01/20 1800     Extra Tube hold for add-on     Comment: Auto resulted       Gold Top - SST [[id number]] Collected:  02/01/20 1655    Specimen:  Blood Updated:  02/01/20 1800     Extra Tube Hold for add-ons.     Comment: Auto resulted.       TSH [[id number]]  (Normal) Collected:  02/01/20 1655    Specimen:  Blood Updated:  02/01/20 1750     TSH 1.870 uIU/mL     T4, Free [[id number]]  (Abnormal) Collected:  02/01/20 1655    Specimen:  Blood Updated:  02/01/20 1750     Free T4 0.79 ng/dL     Narrative:       Results may be falsely increased if patient taking Biotin.      Lipid Panel [[id number]]  (Abnormal) Collected:  02/01/20 1655    Specimen:  Blood Updated:  02/01/20 1740     Total Cholesterol 178 mg/dL      Triglycerides 149 mg/dL      HDL Cholesterol 69 mg/dL      LDL Cholesterol  79 mg/dL      VLDL Cholesterol 29.8 mg/dL      LDL/HDL Ratio 1.15    Narrative:       Cholesterol Reference Ranges  (U.S. Department of Health and Human Services ATP III Classifications)    Desirable          <200 mg/dL  Borderline High    200-239 mg/dL  High Risk          >240 mg/dL      Triglyceride Reference Ranges  (U.S. Department of Health and Human Services ATP III Classifications)    Normal           <150 mg/dL  Borderline High  150-199 mg/dL  High             200-499 mg/dL  Very High        >500 mg/dL    HDL Reference Ranges  (U.S. Department of Health and Human Services ATP III Classifcations)    Low     <40 mg/dl (major risk factor for CHD)  High    >60 mg/dl ('negative' risk factor for CHD)        LDL Reference  Ranges  (U.S. Department of Health and Human Services ATP III Classifcations)    Optimal          <100 mg/dL  Near Optimal     100-129 mg/dL  Borderline High  130-159 mg/dL  High             160-189 mg/dL  Very High        >189 mg/dL    Comprehensive Metabolic Panel [[id number]]  (Abnormal) Collected:  02/01/20 1655    Specimen:  Blood Updated:  02/01/20 1720     Glucose 135 mg/dL      BUN 14 mg/dL      Creatinine 0.84 mg/dL      Sodium 140 mmol/L      Potassium 4.1 mmol/L      Chloride 102 mmol/L      CO2 21.3 mmol/L      Calcium 10.0 mg/dL      Total Protein 8.8 g/dL      Albumin 4.90 g/dL      ALT (SGPT) 28 U/L      AST (SGOT) 33 U/L      Alkaline Phosphatase 91 U/L      Total Bilirubin 0.4 mg/dL      eGFR Non African Amer 93 mL/min/1.73      eGFR  African Amer 112 mL/min/1.73      Globulin 3.9 gm/dL      A/G Ratio 1.3 g/dL      BUN/Creatinine Ratio 16.7     Anion Gap 16.7 mmol/L     Narrative:       GFR Normal >60  Chronic Kidney Disease <60  Kidney Failure <15      Troponin [[id number]]  (Normal) Collected:  02/01/20 1655    Specimen:  Blood Updated:  02/01/20 1720     Troponin T <0.010 ng/mL     Narrative:       Troponin T Reference Range:  <= 0.03 ng/mL-   Negative for AMI  >0.03 ng/mL-     Abnormal for myocardial necrosis.  Clinicians would have to utilize clinical acumen, EKG, Troponin and serial changes to determine if it is an Acute Myocardial Infarction or myocardial injury due to an underlying chronic condition.       Results may be falsely decreased if patient taking Biotin.      Troponin I-Stat [[id number]]  (Normal) Collected:  02/01/20 1655    Specimen:  Blood Updated:  02/01/20 1710     Troponin I 0.010 ng/mL     Narrative:       Normal Patient Upper Reference Limit (URL) (99th Percentile)=0.05 ng/mL   Non-AMI Illness Reference Limit=0.05-0.11 ng/mL   AMI Confirmation=0.12 ng/mL and above    CBC & Differential [[id number]] Collected:  02/01/20 1655    Specimen:  Blood Updated:  02/01/20 1701     Narrative:       The following orders were created for panel order CBC & Differential.  Procedure                               Abnormality         Status                     ---------                               -----------         ------                     CBC Auto Differential[[id number]]        Normal              Final result                 Please view results for these tests on the individual orders.    CBC Auto Differential [[id number]]  (Normal) Collected:  02/01/20 1655    Specimen:  Blood Updated:  02/01/20 1704     WBC 10.43 10*3/mm3      RBC 5.05 10*6/mm3      Hemoglobin 16.1 g/dL      Hematocrit 47.0 %      MCV 93.1 fL      MCH 31.9 pg      MCHC 34.3 g/dL      RDW 14.2 %      RDW-SD 48.6 fl      MPV 9.3 fL      Platelets 345 10*3/mm3      Neutrophil % 63.9 %      Lymphocyte % 25.3 %      Monocyte % 7.8 %      Eosinophil % 1.7 %      Basophil % 0.9 %      Immature Grans % 0.4 %      Neutrophils, Absolute 6.67 10*3/mm3      Lymphocytes, Absolute 2.64 10*3/mm3      Monocytes, Absolute 0.81 10*3/mm3      Eosinophils, Absolute 0.18 10*3/mm3      Basophils, Absolute 0.09 10*3/mm3      Immature Grans, Absolute 0.04 10*3/mm3      nRBC 0.0 /100 WBC     Green Top (No Gel) [[id number]] Collected:  02/01/20 1655    Specimen:  Blood Updated:  02/01/20 1658          Radiology:    Imaging Results (Last 72 Hours)     Procedure Component Value Units Date/Time    XR Chest 1 View [[id number]] Resulted:  02/01/20 1813     Updated:  02/01/20 1813          Assessment:    Palpitations    1.  Bradycardia, with likely tachybradycardia syndrome ; with prior broad complex tachycardia on initial presentation; after drinking alcohol and drinking 5 hour energy drinks daily  2.  Chronic alcohol dependency with positive high alcohol level on admission  3.  Chronic essential hypertension with medication noncompliance  4.  Medication noncompliance  5.  Chronic tobacco dependency/chewing tobacco  6.  Failure to seek routine medical  care  7.  Mild epigastric discomfort while drinking alcohol, prior to admission, with likely chronic gastritis  8.  History of alcohol related hepatitis    Plan:    The patient will be admitted to the ICU to the hospitalist service with consultation provided by Dr. Delgadillo.   he will be provided IV fluids.  Check CIWA scoring and provide Ativan as needed for alcohol withdrawal.  He does not currently appear to be in alcohol withdrawal.  He is already been given magnesium.  Continue oral vitamin supplementation.  His blood pressure is better after metoprolol so hold off on any other medication just yet.  Continue to monitor daily labs and titrate electrolytes and medications accordingly.  A formal 2D echo will be needed tomorrow. He was recommended to stop drinking energy drinks.    He requested anxiety medication, so librium provided.   He requested medication for stomach aching/discomfort. He agreed to avoid further alcohol. He was started on Pepcid and sucralfate.   Tobacco cessation counseling provided.  Nicotine patch refused.  Alcohol cessation counseling   Provided.    Medication risks and benefits were discussed in detail. Patient reported being satisfied with current treatment plan.    Virginia Dickerson DO  02/01/20  7:06 PM    Addendum: Patient called me back to bedside due to anxiety. Reassurance provided. Also, he requested phone number to [facility] for new family physician.

## 2020-06-08 ENCOUNTER — TRANSCRIBE ORDERS (OUTPATIENT)
Dept: ADMINISTRATIVE | Facility: HOSPITAL | Age: 65
End: 2020-06-08

## 2020-06-08 DIAGNOSIS — Z87.898 HISTORY OF ALCOHOL USE: Primary | ICD-10-CM

## 2020-06-11 ENCOUNTER — HOSPITAL ENCOUNTER (OUTPATIENT)
Dept: ULTRASOUND IMAGING | Facility: HOSPITAL | Age: 65
Discharge: HOME | End: 2020-06-11
Admitting: INTERNAL MEDICINE

## 2020-06-11 DIAGNOSIS — Z87.898 HISTORY OF ALCOHOL USE: ICD-10-CM

## 2020-06-11 PROCEDURE — 76705 ECHO EXAM OF ABDOMEN: CPT

## 2020-10-04 ENCOUNTER — HOSPITAL ENCOUNTER (EMERGENCY)
Facility: HOSPITAL | Age: 65
Discharge: HOME | End: 2020-10-04
Attending: EMERGENCY MEDICINE | Admitting: EMERGENCY MEDICINE

## 2020-10-04 ENCOUNTER — APPOINTMENT (OUTPATIENT)
Dept: GENERAL RADIOLOGY | Facility: HOSPITAL | Age: 65
End: 2020-10-04

## 2020-10-04 VITALS
HEART RATE: 80 BPM | WEIGHT: 205 LBS | OXYGEN SATURATION: 95 % | HEIGHT: 71 IN | BODY MASS INDEX: 28.7 KG/M2 | RESPIRATION RATE: 16 BRPM | SYSTOLIC BLOOD PRESSURE: 139 MMHG | TEMPERATURE: 99.1 F | DIASTOLIC BLOOD PRESSURE: 81 MMHG

## 2020-10-04 DIAGNOSIS — J18.9 MULTIFOCAL PNEUMONIA: Primary | ICD-10-CM

## 2020-10-04 LAB
ALBUMIN SERPL-MCNC: 4.1 G/DL (ref 3.5–5.2)
ALBUMIN/GLOB SERPL: 1.2 G/DL
ALP SERPL-CCNC: 88 U/L (ref 39–117)
ALT SERPL W P-5'-P-CCNC: 53 U/L (ref 1–41)
ANION GAP SERPL CALCULATED.3IONS-SCNC: 13.5 MMOL/L (ref 5–15)
AST SERPL-CCNC: 47 U/L (ref 1–40)
BASOPHILS # BLD AUTO: 0.07 10*3/MM3 (ref 0–0.2)
BASOPHILS NFR BLD AUTO: 0.3 % (ref 0–1.5)
BILIRUB SERPL-MCNC: 0.4 MG/DL (ref 0–1.2)
BUN SERPL-MCNC: 21 MG/DL (ref 8–23)
BUN/CREAT SERPL: 25.9 (ref 7–25)
CALCIUM SPEC-SCNC: 8.7 MG/DL (ref 8.6–10.5)
CHLORIDE SERPL-SCNC: 97 MMOL/L (ref 98–107)
CO2 SERPL-SCNC: 21.5 MMOL/L (ref 22–29)
CREAT SERPL-MCNC: 0.81 MG/DL (ref 0.76–1.27)
DEPRECATED RDW RBC AUTO: 43.3 FL (ref 37–54)
EOSINOPHIL # BLD AUTO: 0.1 10*3/MM3 (ref 0–0.4)
EOSINOPHIL NFR BLD AUTO: 0.5 % (ref 0.3–6.2)
ERYTHROCYTE [DISTWIDTH] IN BLOOD BY AUTOMATED COUNT: 12.7 % (ref 12.3–15.4)
FLUAV AG NPH QL: NEGATIVE
FLUBV AG NPH QL IA: NEGATIVE
GFR SERPL CREATININE-BSD FRML MDRD: 96 ML/MIN/1.73
GLOBULIN UR ELPH-MCNC: 3.5 GM/DL
GLUCOSE SERPL-MCNC: 128 MG/DL (ref 65–99)
HCT VFR BLD AUTO: 36 % (ref 37.5–51)
HGB BLD-MCNC: 12 G/DL (ref 13–17.7)
IMM GRANULOCYTES # BLD AUTO: 0.08 10*3/MM3 (ref 0–0.05)
IMM GRANULOCYTES NFR BLD AUTO: 0.4 % (ref 0–0.5)
LYMPHOCYTES # BLD AUTO: 2.14 10*3/MM3 (ref 0.7–3.1)
LYMPHOCYTES NFR BLD AUTO: 10.1 % (ref 19.6–45.3)
MCH RBC QN AUTO: 30.8 PG (ref 26.6–33)
MCHC RBC AUTO-ENTMCNC: 33.3 G/DL (ref 31.5–35.7)
MCV RBC AUTO: 92.5 FL (ref 79–97)
MONOCYTES # BLD AUTO: 1.29 10*3/MM3 (ref 0.1–0.9)
MONOCYTES NFR BLD AUTO: 6.1 % (ref 5–12)
NEUTROPHILS NFR BLD AUTO: 17.41 10*3/MM3 (ref 1.7–7)
NEUTROPHILS NFR BLD AUTO: 82.6 % (ref 42.7–76)
NRBC BLD AUTO-RTO: 0 /100 WBC (ref 0–0.2)
NT-PROBNP SERPL-MCNC: 1072 PG/ML (ref 0–900)
PLATELET # BLD AUTO: 245 10*3/MM3 (ref 140–450)
PMV BLD AUTO: 9.3 FL (ref 6–12)
POTASSIUM SERPL-SCNC: 4.6 MMOL/L (ref 3.5–5.2)
PROT SERPL-MCNC: 7.6 G/DL (ref 6–8.5)
RBC # BLD AUTO: 3.89 10*6/MM3 (ref 4.14–5.8)
SARS-COV-2 RNA PNL SPEC NAA+PROBE: NOT DETECTED
SODIUM SERPL-SCNC: 132 MMOL/L (ref 136–145)
TROPONIN T SERPL-MCNC: <0.01 NG/ML (ref 0–0.03)
WBC # BLD AUTO: 21.09 10*3/MM3 (ref 3.4–10.8)

## 2020-10-04 PROCEDURE — 87635 SARS-COV-2 COVID-19 AMP PRB: CPT | Performed by: NURSE PRACTITIONER

## 2020-10-04 PROCEDURE — 99283 EMERGENCY DEPT VISIT LOW MDM: CPT

## 2020-10-04 PROCEDURE — 84484 ASSAY OF TROPONIN QUANT: CPT | Performed by: NURSE PRACTITIONER

## 2020-10-04 PROCEDURE — 85025 COMPLETE CBC W/AUTO DIFF WBC: CPT | Performed by: NURSE PRACTITIONER

## 2020-10-04 PROCEDURE — 83880 ASSAY OF NATRIURETIC PEPTIDE: CPT | Performed by: NURSE PRACTITIONER

## 2020-10-04 PROCEDURE — 87804 INFLUENZA ASSAY W/OPTIC: CPT | Performed by: NURSE PRACTITIONER

## 2020-10-04 PROCEDURE — 71045 X-RAY EXAM CHEST 1 VIEW: CPT

## 2020-10-04 PROCEDURE — 93005 ELECTROCARDIOGRAM TRACING: CPT | Performed by: NURSE PRACTITIONER

## 2020-10-04 PROCEDURE — 80053 COMPREHEN METABOLIC PANEL: CPT | Performed by: NURSE PRACTITIONER

## 2020-10-04 RX ORDER — PREDNISONE 20 MG/1
20 TABLET ORAL 2 TIMES DAILY
Qty: 10 TABLET | Refills: 0 | Status: SHIPPED | OUTPATIENT
Start: 2020-10-04

## 2020-10-04 RX ORDER — LISINOPRIL 40 MG/1
40 TABLET ORAL DAILY
COMMUNITY

## 2020-10-04 RX ORDER — SODIUM CHLORIDE 0.9 % (FLUSH) 0.9 %
10 SYRINGE (ML) INJECTION AS NEEDED
Status: DISCONTINUED | OUTPATIENT
Start: 2020-10-04 | End: 2020-10-04 | Stop reason: HOSPADM

## 2020-10-04 RX ORDER — DOXYCYCLINE 100 MG/1
100 CAPSULE ORAL 2 TIMES DAILY
Qty: 20 CAPSULE | Refills: 0 | Status: SHIPPED | OUTPATIENT
Start: 2020-10-04

## 2020-10-04 NOTE — ED PROVIDER NOTES
Subjective   History of Present Illness  This is a 65-year-old gentleman who comes in today complaining of head congestion, cough, fever, loss of taste and nausea x3 days.  He denies any other symptoms.  Review of Systems   Constitutional: Negative.    HENT: Positive for congestion.    Eyes: Negative.    Respiratory: Positive for cough, choking and shortness of breath.    Cardiovascular: Negative.    Gastrointestinal: Negative.    Endocrine: Negative.    Genitourinary: Negative.    Musculoskeletal: Negative.    Skin: Negative.    Allergic/Immunologic: Negative.    Neurological: Negative.    Hematological: Negative.    Psychiatric/Behavioral: Negative.        Past Medical History:   Diagnosis Date   • Hypertension        No Known Allergies    History reviewed. No pertinent surgical history.    Family History   Problem Relation Age of Onset   • Hypertension Mother    • Hypertension Father    • Hypertension Sister    • Heart disease Brother        Social History     Socioeconomic History   • Marital status:      Spouse name: Not on file   • Number of children: Not on file   • Years of education: Not on file   • Highest education level: Not on file   Tobacco Use   • Smoking status: Never Smoker   Substance and Sexual Activity   • Alcohol use: Yes     Comment: ETOH REHAB  6 WEEKS AGO; DRINKS A FEW A WEEK NOW   • Drug use: Never           Objective   Physical Exam  Vitals signs reviewed.   Constitutional:       Appearance: Normal appearance. He is normal weight.   Neurological:      Mental Status: He is alert.       GEN: No acute distress  Head: Normocephalic, atraumatic  Eyes: Pupils equal round reactive to light  ENT: Posterior pharynx normal in appearance, oral mucosa is moist  Chest: Nontender to palpation  Cardiovascular: Regular rate  Lungs: Clear to auscultation bilaterally  Abdomen: Soft, nontender, nondistended, no peritoneal signs  Extremities: No edema, normal appearance  Neuro: GCS 15  Psych: Mood and  affect are appropriate    Procedures           ED Course  ED Course as of Oct 04 1648   Sun Oct 04, 2020   1635 EKG interpreted by me reveals sinus rhythm rate 73.  No ectopy no ischemic changes normal EKG.    [PF]   1639 I discussed the findings with the patient.  Have advised him to follow-up with his primary care provider in the next 24 to 48 hours.  Given strict return to care instructions and he is agreeable to this plan of care.    [TW]   1642 Troponin T: <0.010 [PF]   1642 Influenza A Ag, EIA: Negative [PF]   1642 Influenza B Ag, EIA: Negative [PF]   1642 proBNP(!): 1,072.0 [PF]   1642 COVID19: Not Detected [PF]      ED Course User Index  [PF] Francisco Mittal,   [TW] Faby Westbrook, APRN                                           MDM  Number of Diagnoses or Management Options     Amount and/or Complexity of Data Reviewed  Clinical lab tests: ordered and reviewed  Tests in the radiology section of CPT®: ordered and reviewed  Review and summarize past medical records: yes  Discuss the patient with other providers: yes  Independent visualization of images, tracings, or specimens: yes    Risk of Complications, Morbidity, and/or Mortality  Presenting problems: moderate  Diagnostic procedures: moderate  Management options: moderate        Final diagnoses:   Multifocal pneumonia            Faby Westbrook, APRN  10/04/20 4528

## 2022-02-07 ENCOUNTER — OFFICE VISIT (OUTPATIENT)
Dept: UROLOGY | Facility: CLINIC | Age: 67
End: 2022-02-07

## 2022-02-07 VITALS
WEIGHT: 205 LBS | RESPIRATION RATE: 18 BRPM | HEIGHT: 71 IN | HEART RATE: 82 BPM | DIASTOLIC BLOOD PRESSURE: 70 MMHG | TEMPERATURE: 99 F | BODY MASS INDEX: 28.7 KG/M2 | OXYGEN SATURATION: 94 % | SYSTOLIC BLOOD PRESSURE: 140 MMHG

## 2022-02-07 DIAGNOSIS — N48.6 PEYRONIE'S DISEASE: Primary | ICD-10-CM

## 2022-02-07 PROCEDURE — 99204 OFFICE O/P NEW MOD 45 MIN: CPT | Performed by: UROLOGY

## 2022-02-07 RX ORDER — PAROXETINE HYDROCHLORIDE 20 MG/1
TABLET, FILM COATED ORAL
COMMUNITY
Start: 2022-01-22

## 2022-02-07 RX ORDER — BUPRENORPHINE HYDROCHLORIDE AND NALOXONE HYDROCHLORIDE DIHYDRATE 8; 2 MG/1; MG/1
TABLET SUBLINGUAL
COMMUNITY
Start: 2022-02-02

## 2022-02-07 NOTE — PROGRESS NOTES
Chief Complaint  Chief Complaint   Patient presents with   • Abnormal Penile Curvature     New patient      Referring Provider:  Marielos Ashley APRN HPI  Mr. Vinson is a 66 y.o. male with below past medical history who presents with Peyronies disease and ED.    Curvature is to the left. Unable to penetrate.   Erections are 9/10  Duration is approximately 6 months.  He denies any hourglass deformity.  He does describe small amount of loss of length.  He is very bothered by this and desires for curvature to be corrected prior to getting .    Past Medical History  Past Medical History:   Diagnosis Date   • Hypertension        Past Surgical History  No past surgical history on file.    Medications    Current Outpatient Medications:   •  buprenorphine-naloxone (SUBOXONE) 8-2 MG per SL tablet, , Disp: , Rfl:   •  busPIRone (BUSPAR) 10 MG tablet, Take 1 tablet by mouth 2 (Two) Times a Day., Disp: 20 tablet, Rfl: 0  •  doxycycline (MONODOX) 100 MG capsule, Take 1 capsule by mouth 2 (Two) Times a Day., Disp: 20 capsule, Rfl: 0  •  lisinopril (PRINIVIL,ZESTRIL) 40 MG tablet, Take 40 mg by mouth Daily., Disp: , Rfl:   •  PARoxetine (PAXIL) 20 MG tablet, , Disp: , Rfl:   •  predniSONE (DELTASONE) 20 MG tablet, Take 1 tablet by mouth 2 (Two) Times a Day., Disp: 10 tablet, Rfl: 0    Allergies  No Known Allergies    Social History  Social History     Socioeconomic History   • Marital status:    Tobacco Use   • Smoking status: Never Smoker   Substance and Sexual Activity   • Alcohol use: Yes     Comment: ETOH REHAB  6 WEEKS AGO; DRINKS A FEW A WEEK NOW   • Drug use: Never       Family History  Family History   Problem Relation Age of Onset   • Hypertension Mother    • Hypertension Father    • Hypertension Sister    • Heart disease Brother        Review of Systems  Review of systems was notable for etoh dependence.    Physical Exam  Visit Vitals  /70   Pulse 82   Temp 99 °F (37.2 °C) (Temporal)   Resp 18  "  Ht 180.3 cm (71\")   Wt 93 kg (205 lb)   SpO2 94%   BMI 28.59 kg/m²     Physical exam was performed and was notable for mild obesity    Genitourinary  deferred by patient to next visit    Labs  No results found for: PSA    Lab Results   Component Value Date    GLUCOSE 128 (H) 10/04/2020    CALCIUM 8.7 10/04/2020     (L) 10/04/2020    K 4.6 10/04/2020    CO2 21.5 (L) 10/04/2020    CL 97 (L) 10/04/2020    BUN 21 10/04/2020    CREATININE 0.81 10/04/2020    EGFRIFAFRI 120 02/02/2020    EGFRIFNONA 96 10/04/2020    BCR 25.9 (H) 10/04/2020    ANIONGAP 13.5 10/04/2020       Lab Results   Component Value Date    WBC 21.09 (H) 10/04/2020    HGB 12.0 (L) 10/04/2020    HCT 36.0 (L) 10/04/2020    MCV 92.5 10/04/2020     10/04/2020       Brief Urine Lab Results     None           Radiologic Studies  No Images in the past 120 days found..    Assessment  Mr. Vinson is a 66 y.o. male who presents with recent report of worsening of chronic Peyronies disease.  He has a history of chronic alcohol dependence.  He is very anxious during exam today.    Plan  1.  FU for curvature assessment w/ ICI.  Risks for complications are prior psychiatric disease and substance dependence.      Kal Collier MD    "

## 2022-02-08 ENCOUNTER — PATIENT ROUNDING (BHMG ONLY) (OUTPATIENT)
Dept: UROLOGY | Facility: CLINIC | Age: 67
End: 2022-02-08

## 2022-02-09 NOTE — PROGRESS NOTES
2022    Hello, may I speak with Arvind Vinson? Spoke with patient.    My name is Teresa    I am  with [facility]  [address]  [address]  [facility] [address] [phone].    Before we get started may I verify your date of birth? 1955 Correct .    I am calling to officially welcome you to our practice and ask about your recent visit. Is this a good time to talk? Yes.    Tell me about your visit with us. What things went well?  Everything went well, pt was glad I called because he has not heard from the pharmacy yet. I told patient I would check on that and see what the hold up was.       We're always looking for ways to make our patients' experiences even better. Do you have recommendations on ways we may improve?  No.    Overall were you satisfied with your first visit to our practice? Yes.       I appreciate you taking the time to speak with me today. Is there anything else I can do for you?No.      Thank you, and have a great day.

## 2022-02-11 ENCOUNTER — TELEPHONE (OUTPATIENT)
Dept: UROLOGY | Facility: CLINIC | Age: 67
End: 2022-02-11

## 2022-02-11 NOTE — TELEPHONE ENCOUNTER
Hub staff attempted to follow warm transfer process and was unsuccessful     Caller: Arvind Vinson    Relationship to patient: Self    Best call back number:[phone]  Patient is needing: PT CALLED BACK ASKING ABOUT THE HOLD UP ON MEDICATION THAT WAS DISCUSSED WITH MITCH ON 2/8/22 SINCE MITCH SAID SHE WOULD GET BACK WITH HIM.

## 2022-02-24 ENCOUNTER — OFFICE VISIT (OUTPATIENT)
Dept: UROLOGY | Facility: CLINIC | Age: 67
End: 2022-02-24

## 2022-02-24 VITALS
HEIGHT: 71 IN | RESPIRATION RATE: 18 BRPM | OXYGEN SATURATION: 98 % | WEIGHT: 205 LBS | SYSTOLIC BLOOD PRESSURE: 145 MMHG | BODY MASS INDEX: 28.7 KG/M2 | HEART RATE: 89 BPM | DIASTOLIC BLOOD PRESSURE: 90 MMHG

## 2022-02-24 DIAGNOSIS — N48.6 PEYRONIE'S DISEASE: Primary | ICD-10-CM

## 2022-02-24 DIAGNOSIS — N52.9 ERECTILE DYSFUNCTION, UNSPECIFIED ERECTILE DYSFUNCTION TYPE: ICD-10-CM

## 2022-02-24 PROCEDURE — 99214 OFFICE O/P EST MOD 30 MIN: CPT | Performed by: UROLOGY

## 2022-02-24 RX ORDER — SILDENAFIL CITRATE 20 MG/1
100 TABLET ORAL DAILY PRN
Qty: 60 TABLET | Refills: 11 | Status: SHIPPED | OUTPATIENT
Start: 2022-02-24 | End: 2022-06-02 | Stop reason: SDUPTHER

## 2022-02-24 RX ORDER — SODIUM CHLORIDE 9 MG/ML
100 INJECTION, SOLUTION INTRAVENOUS CONTINUOUS
Status: CANCELLED | OUTPATIENT
Start: 2022-02-24

## 2022-02-24 NOTE — PROGRESS NOTES
"Chief Complaint   Patient presents with   • Follow-up     [facility] teaching      [facility]  Ms. Vinson is a 66 y.o. male with history below in assessment, who presents for follow up.     At this visit and curvature assessment.     Past Medical History:   Diagnosis Date   • Hypertension        No past surgical history on file.      Current Outpatient Medications:   •  buprenorphine-naloxone (SUBOXONE) 8-2 MG per SL tablet, , Disp: , Rfl:   •  busPIRone (BUSPAR) 10 MG tablet, Take 1 tablet by mouth 2 (Two) Times a Day., Disp: 20 tablet, Rfl: 0  •  lisinopril (PRINIVIL,ZESTRIL) 40 MG tablet, Take 40 mg by mouth Daily., Disp: , Rfl:   •  PARoxetine (PAXIL) 20 MG tablet, , Disp: , Rfl:   •  doxycycline (MONODOX) 100 MG capsule, Take 1 capsule by mouth 2 (Two) Times a Day., Disp: 20 capsule, Rfl: 0  •  predniSONE (DELTASONE) 20 MG tablet, Take 1 tablet by mouth 2 (Two) Times a Day., Disp: 10 tablet, Rfl: 0     Physical Exam  Visit Vitals  /90   Pulse 89   Resp 18   Ht 180.3 cm (70.98\")   Wt 93 kg (205 lb)   SpO2 98%   BMI 28.60 kg/m²       Labs  Brief Urine Lab Results     None          Lab Results   Component Value Date    GLUCOSE 128 (H) 10/04/2020    CALCIUM 8.7 10/04/2020     (L) 10/04/2020    K 4.6 10/04/2020    CO2 21.5 (L) 10/04/2020    CL 97 (L) 10/04/2020    BUN 21 10/04/2020    CREATININE 0.81 10/04/2020    EGFRIFAFRI 120 02/02/2020    EGFRIFNONA 96 10/04/2020    BCR 25.9 (H) 10/04/2020    ANIONGAP 13.5 10/04/2020       Lab Results   Component Value Date    WBC 21.09 (H) 10/04/2020    HGB 12.0 (L) 10/04/2020    HCT 36.0 (L) 10/04/2020    MCV 92.5 10/04/2020     10/04/2020            No results found for: PSA          Radiographic Studies  No Images in the past 120 days found..      I have reviewed above labs and imaging.     Assessment  66 y.o. male with worsening of chronic peyronies and ED.     In the office he was injected with 0.2 mL of Trimix with a 100% erection.  He had left lateral curvature with " point of maximal curvature 2 cm from the base.  Degree of curvature was approximately 45 to 50 degrees.  After careful discussion of risks and benefits, patient desires for correction with penile plication.    Plan  1. Schedule for plication  2. Sildenafil for mild ED per the patient's preference

## 2022-03-08 ENCOUNTER — PRE-ADMISSION TESTING (OUTPATIENT)
Dept: PREADMISSION TESTING | Facility: HOSPITAL | Age: 67
End: 2022-03-08

## 2022-03-08 VITALS — WEIGHT: 222.6 LBS | HEIGHT: 71 IN | BODY MASS INDEX: 31.16 KG/M2

## 2022-03-08 PROCEDURE — 85027 COMPLETE CBC AUTOMATED: CPT | Performed by: UROLOGY

## 2022-03-08 PROCEDURE — 93010 ELECTROCARDIOGRAM REPORT: CPT | Performed by: INTERNAL MEDICINE

## 2022-03-08 PROCEDURE — 93005 ELECTROCARDIOGRAM TRACING: CPT

## 2022-03-08 PROCEDURE — 80048 BASIC METABOLIC PNL TOTAL CA: CPT | Performed by: UROLOGY

## 2022-03-08 NOTE — DISCHARGE INSTRUCTIONS

## 2022-03-11 LAB
QT INTERVAL: 400 MS
QTC INTERVAL: 425 MS

## 2022-03-18 ENCOUNTER — LAB (OUTPATIENT)
Dept: LAB | Facility: HOSPITAL | Age: 67
End: 2022-03-18

## 2022-03-18 DIAGNOSIS — N48.6 PEYRONIE'S DISEASE: ICD-10-CM

## 2022-03-18 LAB — SARS-COV-2 RNA PNL SPEC NAA+PROBE: NOT DETECTED

## 2022-03-18 PROCEDURE — U0004 COV-19 TEST NON-CDC HGH THRU: HCPCS

## 2022-03-18 PROCEDURE — C9803 HOPD COVID-19 SPEC COLLECT: HCPCS

## 2022-03-22 ENCOUNTER — ANESTHESIA EVENT (OUTPATIENT)
Dept: PERIOP | Facility: HOSPITAL | Age: 67
End: 2022-03-22

## 2022-03-22 ENCOUNTER — HOSPITAL ENCOUNTER (OUTPATIENT)
Facility: HOSPITAL | Age: 67
Setting detail: HOSPITAL OUTPATIENT SURGERY
Discharge: HOME | End: 2022-03-22
Attending: UROLOGY | Admitting: UROLOGY

## 2022-03-22 ENCOUNTER — ANESTHESIA (OUTPATIENT)
Dept: PERIOP | Facility: HOSPITAL | Age: 67
End: 2022-03-22

## 2022-03-22 VITALS
DIASTOLIC BLOOD PRESSURE: 88 MMHG | RESPIRATION RATE: 16 BRPM | TEMPERATURE: 97.7 F | SYSTOLIC BLOOD PRESSURE: 156 MMHG | OXYGEN SATURATION: 94 % | HEART RATE: 78 BPM

## 2022-03-22 DIAGNOSIS — N48.6 PEYRONIE'S DISEASE: ICD-10-CM

## 2022-03-22 PROCEDURE — 54360 PENIS PLASTIC SURGERY: CPT | Performed by: UROLOGY

## 2022-03-22 PROCEDURE — 0 CEFAZOLIN SODIUM-DEXTROSE 2-3 GM-%(50ML) RECONSTITUTED SOLUTION: Performed by: UROLOGY

## 2022-03-22 PROCEDURE — 25010000002 PROPOFOL 200 MG/20ML EMULSION: Performed by: NURSE ANESTHETIST, CERTIFIED REGISTERED

## 2022-03-22 PROCEDURE — 25010000002 FENTANYL CITRATE (PF) 100 MCG/2ML SOLUTION: Performed by: NURSE ANESTHETIST, CERTIFIED REGISTERED

## 2022-03-22 PROCEDURE — 25010000002 ONDANSETRON PER 1 MG: Performed by: NURSE ANESTHETIST, CERTIFIED REGISTERED

## 2022-03-22 PROCEDURE — 25010000002 LORAZEPAM PER 2 MG: Performed by: UROLOGY

## 2022-03-22 PROCEDURE — 25010000002 DEXAMETHASONE PER 1 MG: Performed by: NURSE ANESTHETIST, CERTIFIED REGISTERED

## 2022-03-22 DEVICE — IMPLANTABLE DEVICE: Type: IMPLANTABLE DEVICE | Site: PENIS | Status: FUNCTIONAL

## 2022-03-22 RX ORDER — LORAZEPAM 2 MG/ML
1 INJECTION INTRAMUSCULAR ONCE
Status: COMPLETED | OUTPATIENT
Start: 2022-03-22 | End: 2022-03-22

## 2022-03-22 RX ORDER — MEPERIDINE HYDROCHLORIDE 25 MG/ML
25 INJECTION INTRAMUSCULAR; INTRAVENOUS; SUBCUTANEOUS ONCE AS NEEDED
Status: DISCONTINUED | OUTPATIENT
Start: 2022-03-22 | End: 2022-03-22 | Stop reason: HOSPADM

## 2022-03-22 RX ORDER — SODIUM CHLORIDE 9 MG/ML
INJECTION, SOLUTION INTRAVENOUS AS NEEDED
Status: DISCONTINUED | OUTPATIENT
Start: 2022-03-22 | End: 2022-03-22 | Stop reason: HOSPADM

## 2022-03-22 RX ORDER — SODIUM CHLORIDE 9 MG/ML
100 INJECTION, SOLUTION INTRAVENOUS CONTINUOUS
Status: DISCONTINUED | OUTPATIENT
Start: 2022-03-22 | End: 2022-03-22 | Stop reason: HOSPADM

## 2022-03-22 RX ORDER — ACETAMINOPHEN 500 MG
1000 TABLET ORAL EVERY 6 HOURS
Qty: 30 TABLET | Refills: 0 | Status: SHIPPED | OUTPATIENT
Start: 2022-03-22 | End: 2022-03-25

## 2022-03-22 RX ORDER — ONDANSETRON 2 MG/ML
4 INJECTION INTRAMUSCULAR; INTRAVENOUS ONCE AS NEEDED
Status: DISCONTINUED | OUTPATIENT
Start: 2022-03-22 | End: 2022-03-22 | Stop reason: HOSPADM

## 2022-03-22 RX ORDER — DEXAMETHASONE SODIUM PHOSPHATE 4 MG/ML
INJECTION, SOLUTION INTRA-ARTICULAR; INTRALESIONAL; INTRAMUSCULAR; INTRAVENOUS; SOFT TISSUE AS NEEDED
Status: DISCONTINUED | OUTPATIENT
Start: 2022-03-22 | End: 2022-03-22 | Stop reason: SURG

## 2022-03-22 RX ORDER — MAGNESIUM HYDROXIDE 1200 MG/15ML
LIQUID ORAL AS NEEDED
Status: DISCONTINUED | OUTPATIENT
Start: 2022-03-22 | End: 2022-03-22 | Stop reason: HOSPADM

## 2022-03-22 RX ORDER — BUPIVACAINE HYDROCHLORIDE 5 MG/ML
INJECTION, SOLUTION EPIDURAL; INTRACAUDAL AS NEEDED
Status: DISCONTINUED | OUTPATIENT
Start: 2022-03-22 | End: 2022-03-22 | Stop reason: HOSPADM

## 2022-03-22 RX ORDER — BACITRACIN ZINC 500 [USP'U]/G
OINTMENT TOPICAL AS NEEDED
Status: DISCONTINUED | OUTPATIENT
Start: 2022-03-22 | End: 2022-03-22 | Stop reason: HOSPADM

## 2022-03-22 RX ORDER — SULFAMETHOXAZOLE AND TRIMETHOPRIM 800; 160 MG/1; MG/1
1 TABLET ORAL 2 TIMES DAILY
Qty: 14 TABLET | Refills: 0 | Status: SHIPPED | OUTPATIENT
Start: 2022-03-22 | End: 2022-03-29

## 2022-03-22 RX ORDER — LIDOCAINE HYDROCHLORIDE 20 MG/ML
INJECTION, SOLUTION INTRAVENOUS AS NEEDED
Status: DISCONTINUED | OUTPATIENT
Start: 2022-03-22 | End: 2022-03-22 | Stop reason: SURG

## 2022-03-22 RX ORDER — ONDANSETRON 2 MG/ML
INJECTION INTRAMUSCULAR; INTRAVENOUS AS NEEDED
Status: DISCONTINUED | OUTPATIENT
Start: 2022-03-22 | End: 2022-03-22 | Stop reason: SURG

## 2022-03-22 RX ORDER — CEFAZOLIN SODIUM 2 G/50ML
2 SOLUTION INTRAVENOUS ONCE
Status: COMPLETED | OUTPATIENT
Start: 2022-03-22 | End: 2022-03-22

## 2022-03-22 RX ORDER — DOCUSATE SODIUM 100 MG/1
100 CAPSULE, LIQUID FILLED ORAL 2 TIMES DAILY
Qty: 15 CAPSULE | Refills: 1 | Status: SHIPPED | OUTPATIENT
Start: 2022-03-22

## 2022-03-22 RX ORDER — PROPOFOL 10 MG/ML
INJECTION, EMULSION INTRAVENOUS AS NEEDED
Status: DISCONTINUED | OUTPATIENT
Start: 2022-03-22 | End: 2022-03-22 | Stop reason: SURG

## 2022-03-22 RX ORDER — FENTANYL CITRATE 50 UG/ML
INJECTION, SOLUTION INTRAMUSCULAR; INTRAVENOUS AS NEEDED
Status: DISCONTINUED | OUTPATIENT
Start: 2022-03-22 | End: 2022-03-22 | Stop reason: SURG

## 2022-03-22 RX ORDER — MORPHINE SULFATE 2 MG/ML
2 INJECTION, SOLUTION INTRAMUSCULAR; INTRAVENOUS
Status: DISCONTINUED | OUTPATIENT
Start: 2022-03-22 | End: 2022-03-22 | Stop reason: HOSPADM

## 2022-03-22 RX ADMIN — LIDOCAINE HYDROCHLORIDE 60 MG: 20 INJECTION, SOLUTION INTRAVENOUS at 12:26

## 2022-03-22 RX ADMIN — PROPOFOL 200 MG: 10 INJECTION, EMULSION INTRAVENOUS at 12:26

## 2022-03-22 RX ADMIN — CEFAZOLIN SODIUM 2 G: 2 SOLUTION INTRAVENOUS at 12:28

## 2022-03-22 RX ADMIN — LORAZEPAM 1 MG: 2 INJECTION INTRAMUSCULAR; INTRAVENOUS at 11:15

## 2022-03-22 RX ADMIN — DEXAMETHASONE SODIUM PHOSPHATE 4 MG: 4 INJECTION, SOLUTION INTRAMUSCULAR; INTRAVENOUS at 12:26

## 2022-03-22 RX ADMIN — FENTANYL CITRATE 100 MCG: 50 INJECTION, SOLUTION INTRAMUSCULAR; INTRAVENOUS at 13:01

## 2022-03-22 RX ADMIN — SODIUM CHLORIDE: 9 INJECTION, SOLUTION INTRAVENOUS at 13:12

## 2022-03-22 RX ADMIN — ONDANSETRON 4 MG: 2 INJECTION INTRAMUSCULAR; INTRAVENOUS at 12:26

## 2022-03-22 RX ADMIN — SODIUM CHLORIDE 100 ML/HR: 9 INJECTION, SOLUTION INTRAVENOUS at 10:38

## 2022-03-22 NOTE — ANESTHESIA PROCEDURE NOTES
Airway  Urgency: elective    Date/Time: 3/22/2022 12:26 PM  Airway not difficult    General Information and Staff    Patient location during procedure: OR  CRNA: Raymundo Alfonso CRNA    Indications and Patient Condition  Indications for airway management: airway protection    Preoxygenated: yes  Mask difficulty assessment: 0 - not attempted    Final Airway Details  Final airway type: supraglottic airway      Successful airway: classic  Size 4    Number of attempts at approach: 1  Assessment: lips, teeth, and gum same as pre-op and atraumatic intubation    Additional Comments  Airway pressure leak at <20cm/h20

## 2022-03-22 NOTE — ANESTHESIA PREPROCEDURE EVALUATION
Anesthesia Evaluation     Patient summary reviewed and Nursing notes reviewed   NPO Solid Status: > 8 hours  NPO Liquid Status: > 8 hours           Airway   Mallampati: II  TM distance: >3 FB  Neck ROM: full  Possible difficult intubation  Dental - normal exam     Pulmonary - normal exam   Cardiovascular - normal exam    (+) hypertension well controlled less than 2 medications,       Neuro/Psych  (+) psychiatric history Anxiety and Depression,    GI/Hepatic/Renal/Endo      Musculoskeletal     Abdominal  - normal exam   Substance History   (+) alcohol use,      OB/GYN          Other                      Anesthesia Plan    ASA 2     general     intravenous induction     Anesthetic plan, all risks, benefits, and alternatives have been provided, discussed and informed consent has been obtained with: patient.        CODE STATUS:

## 2022-03-22 NOTE — OP NOTE
Preoperative diagnosis  Peyronie's disease    Postoperative diagnosis  Same    Procedure performed  1. Penile plication - CPT 79475    Attending surgeon  Kal Collier MD    Anesthesia  General anesthesia  10 mL bupivicaine 0.5% local injection    EBL  <5mL    Complications  None    Specimen  Foreskin    Indications  66 y.o. male agreed to undergo the above named procedure after discussion of the alternatives, risks and benefits. Informed consent was obtained.    Procedure  The patient was taken to the operating room and placed supine on the operating table.  Pre-operative antibiotics were administered.  Bilateral lower extremity SCDs were placed.  After induction of general anesthesia the patient was maintained in supine position.  The patient's genitalia was trimmed, prepped and draped in a sterile fashion and a time-out was performed.      A penile block was performed prior to the procedure with instillation of 10 mL of 0.5% bupivacaine at the 10 and 2 o'clock position.  We then used a tourniquet and created an artificial erection to identify the point of maximal curvature.  The patient had approximately 60 degree left lateral curvature with the point of maximal curvature at the base of the penis approximately 2 to 3 cm from the suprapubic skin.  This was marked out.  I then made a longitudinal right lateral incision over the convex portion of the penis.  This was dissected down through the dartos fascia, the Maya's fascia was opened up as well, and the tunica albuginea was exposed.  A repeat artificial erection was performed and Latta style dots were made to plot out suture placement.  The erection was then let down, and I placed 4 total lines of Ticron suture, 2 in each row, which created a nice straight penis on repeat artificial erection.  I then performed a multilayer closure with the Maya's fascia, followed by the dartos and skin.  Xeroform dressing, Gagandeep, and Coban were all applied. The patient was  awakened, extubated and taken to recovery unit without difficulty.     At the end of the procedure all instrument, needle and sponge counts were correct x 2.

## 2022-03-22 NOTE — DISCHARGE INSTRUCTIONS
Home Care after Penile Surgery  Follow these guidelines for your care after your surgery to help your recovery.    Activity  Limit your activity for the first 5 days after surgery to light activity.  You may return to work in a day or so, when comfortably.  Limit lifting, pushing or pulling to less than 20 pounds for the next 5 days. Also limit running and long walks.     Swelling  Penile swelling from the surgery may take weeks to get better. You should call your doctor if the swelling is severe.  Use ice packs to the scrotum and penis for 15 minutes every hour for the first 48 hours when you are awake to limit swelling. Use a plastic bag with ice or a bag of frozen peas for the ice pack. Wrap a cloth or towel around the ice pack so the ice does not directly touch your skin.  Wear a jock strap or tight underwear for the next week to support your scrotum and reduce swelling.    Incision care  Stitches will dissolve and do not need to be removed.   Expect a small amount of blood may stain the dressings for up to 72 hours after surgery.   For the first few days, apply two or three gauze pads to the site each day and as needed to keep the dressing dry. This will protect the incision and help keep your clothes clean.  When you are no longer having any drainage, stop using the gauze pads over the site.    Bathing or showering  You may shower 48 to 72 hours after surgery.  Allow the water to wash over the incision but do not scrub the incision. Dry the site gently by patting it with a clean towel.   Tub baths should be avoided for 14 days after surgery.   Swimming should be avoided for 14 days after surgery.      Pain control  You will likely be sent home with a prescription for a few days of pain medicine.  Use this only as needed.  After 48 hours, most patients can take extra strength Tylenol (acetaminophen) or Advil (ibuprofen) for pain, following the label directions. Pain most often is eased after 5 to 7  days.    Sexual activity  May begin when you are comfortable and all the stitches have dissolved.    When to call your doctor  Severe swelling, larger than the size of an orange   A large amount of fluid drainage that soaks several pads per day  Pain that is not controlled with pain medicine and use of ice packs  Any signs of infection such as:  Increased redness or tenderness around the incision site  Pus type drainage from the incision  Fever of greater than 101 degrees F    Other Contacts  Urology Office:  [address]   [place] [id number] [phone] office  [phone] fax    Follow up Appointment  You have a follow up scheduled with Dr. Collier on 4/21/2022 2:10 PM

## 2022-03-22 NOTE — ANESTHESIA POSTPROCEDURE EVALUATION
Patient: Arvind Vinson    Procedure Summary     Date: 03/22/22 Room / Location: [facility] OR  /  [facility]    Anesthesia Start: 1221 Anesthesia Stop: 1350    Procedure: PENIS PLAQUE EXCISION WITH CORPORAL PLICATION (N/A Penis) Diagnosis:       Peyronie's disease      (Peyronie's disease [N48.6])    Surgeons: Kal Collier MD Provider: Raymundo Alfonso CRNA    Anesthesia Type: general ASA Status: 2          Anesthesia Type: general    Vitals  Vitals Value Taken Time   /86 03/22/22 1425   Temp 97.7 °F (36.5 °C) 03/22/22 1440   Pulse 81 03/22/22 1440   Resp 16 03/22/22 1425   SpO2 94 % 03/22/22 1440           Post Anesthesia Care and Evaluation    Patient location during evaluation: PACU  Patient participation: complete - patient participated  Level of consciousness: awake  Pain score: 3  Pain management: adequate  Airway patency: patent  Anesthetic complications: No anesthetic complications  PONV Status: controlled  Cardiovascular status: acceptable and stable  Respiratory status: acceptable and face mask  Hydration status: acceptable

## 2022-03-22 NOTE — H&P
"HPI  Arvind Vinson is a 66 y.o. with history of   1. Peyronie's disease         No recent fevers or new LUTS  Does not take any blood thinners    Past Medical History  Past Medical History:   Diagnosis Date   • Anxiety    • COVID-19 vaccine administered     Muna/Moderna.  2 vaccines   • Depression    • H/O exercise stress test    • Hypertension        Past Surgical History  Past Surgical History:   Procedure Laterality Date   • FOOT SURGERY         Medications    Current Facility-Administered Medications:   •  ceFAZolin Sodium-Dextrose (ANCEF) IVPB (duplex) 2 g, 2 g, Intravenous, Once, Kal Collier MD  •  sodium chloride 0.9 % infusion, 100 mL/hr, Intravenous, Continuous, Kal Collier MD, Last Rate: 100 mL/hr at 03/22/22 1038, 100 mL/hr at 03/22/22 1038    Allergies  No Known Allergies    Social History  Social History     Socioeconomic History   • Marital status:    Tobacco Use   • Smoking status: Never Smoker   • Smokeless tobacco: Current User     Types: Snuff   • Tobacco comment: pt reports used this am 3/22/22 at 1000   Vaping Use   • Vaping Use: Never used   Substance and Sexual Activity   • Alcohol use: Yes     Comment: [facility]  6 WEEKS AGO; DRINKS beers 7-8 A WEEK NOW   • Drug use: Not Currently     Types: Hydrocodone     Comment: pt reports \"4 years ago\"   • Sexual activity: Never       Review of Systems  Constitutional: No fevers or chills  Skin: Negative for rash  Endocrine: No heat/cold intolerance   Cardiovascular: Negative for chest pain or dyspnea on exertion  Respiratory: Negative for shortness of breath or wheezing  Gastrointestinal: No constipation, nausea or vomiting  Genitourinary: Negative for new lower urinary tract symptoms, current gross hematuria or dysuria.  Musculoskeletal: No flank pain  Neurological:  Negative for frequent headaches or dizziness  Lymph/Heme: Negative for leg swelling or calf pain.    Physical Exam  Visit Vitals  /97 (BP Location: " Left arm, Patient Position: Sitting)   Pulse 73   Temp 99 °F (37.2 °C) (Temporal)   Resp 20   SpO2 95%     Constitutional: NAD, WDWN.   HEENT: NCAT. Conjunctivae normal.  MMM.    Cardiovascular: Regular rate.  Pulmonary/Chest: Respirations are even and non-labored bilaterally.  Abdominal: Soft. No distension, tenderness, masses or guarding. No CVA tenderness.  Neurological: A + O x 3.  Cranial Nerves II-XII grossly intact. Normal gait.  Extremities: LUCERO x 4, Warm. No clubbing.  No cyanosis.    Skin: Pink, warm and dry.  No rashes noted.  Psychiatric:  Normal mood and affect    Labs & Imaging  Lab Results   Component Value Date    GLUCOSE 101 (H) 03/08/2022    CALCIUM 9.2 03/08/2022     (L) 03/08/2022    K 4.5 03/08/2022    CO2 26.0 03/08/2022    CL 95 (L) 03/08/2022    BUN 12 03/08/2022    CREATININE 0.70 (L) 03/08/2022    EGFRIFAFRI 120 02/02/2020    EGFRIFNONA 96 10/04/2020    BCR 17.1 03/08/2022    ANIONGAP 12.0 03/08/2022     Lab Results   Component Value Date    WBC 9.97 03/08/2022    HGB 13.9 03/08/2022    HCT 41.0 03/08/2022    MCV 90.7 03/08/2022     03/08/2022     Brief Urine Lab Results     None             No results found.        Assessment  Arvind Vinson is a 66 y.o. male who presents with the following diagnosis:  1. Peyronie's disease         Plan  1. To OR for PENIS PLAQUE EXCISION WITH CORPORAL PLICATION     Kal Collier MD

## 2022-03-31 ENCOUNTER — OFFICE VISIT (OUTPATIENT)
Dept: UROLOGY | Facility: CLINIC | Age: 67
End: 2022-03-31

## 2022-03-31 VITALS
DIASTOLIC BLOOD PRESSURE: 82 MMHG | SYSTOLIC BLOOD PRESSURE: 136 MMHG | BODY MASS INDEX: 31.08 KG/M2 | WEIGHT: 222 LBS | TEMPERATURE: 97.3 F | OXYGEN SATURATION: 93 % | HEART RATE: 88 BPM | HEIGHT: 71 IN

## 2022-03-31 DIAGNOSIS — L08.9 WOUND INFECTION: ICD-10-CM

## 2022-03-31 DIAGNOSIS — N48.6 PEYRONIE'S DISEASE: Primary | ICD-10-CM

## 2022-03-31 DIAGNOSIS — T14.8XXA WOUND INFECTION: ICD-10-CM

## 2022-03-31 PROCEDURE — 99024 POSTOP FOLLOW-UP VISIT: CPT | Performed by: UROLOGY

## 2022-03-31 RX ORDER — SULFAMETHOXAZOLE AND TRIMETHOPRIM 800; 160 MG/1; MG/1
1 TABLET ORAL 2 TIMES DAILY
Qty: 28 TABLET | Refills: 0 | Status: SHIPPED | OUTPATIENT
Start: 2022-03-31 | End: 2022-03-31

## 2022-03-31 RX ORDER — SULFAMETHOXAZOLE AND TRIMETHOPRIM 800; 160 MG/1; MG/1
1 TABLET ORAL 2 TIMES DAILY
Qty: 28 TABLET | Refills: 0 | Status: SHIPPED | OUTPATIENT
Start: 2022-03-31 | End: 2022-04-14

## 2022-03-31 RX ORDER — SIMETHICONE 80 MG
TABLET,CHEWABLE ORAL
COMMUNITY
Start: 2022-03-29

## 2022-03-31 NOTE — PROGRESS NOTES
Chief Complaint   Patient presents with   • Follow-up     Per sheree HICKS  Ms. Vinson is a 66 y.o. male with history below in assessment, who presents for follow up.     At this visit patient complains of opening up of the skin of his wound from his plication surgery.    Past Medical History:   Diagnosis Date   • Anxiety    • COVID-19 vaccine administered     Muna/Moderna.  2 vaccines   • Depression    • H/O exercise stress test    • Hypertension        Past Surgical History:   Procedure Laterality Date   • FOOT SURGERY     • PENIS PLAQUE EXCISION WITH CORPORAL PLICATION N/A 3/22/2022    Procedure: PENIS PLAQUE EXCISION WITH CORPORAL PLICATION;  Surgeon: Kal Collier MD;  Location: [facility];  Service: Urology;  Laterality: N/A;         Current Outpatient Medications:   •  sulfamethoxazole-trimethoprim (Bactrim DS) 800-160 MG per tablet, Take 1 tablet by mouth 2 (Two) Times a Day for 14 days., Disp: 28 tablet, Rfl: 0  •  buprenorphine-naloxone (SUBOXONE) 8-2 MG per SL tablet, , Disp: , Rfl:   •  busPIRone (BUSPAR) 10 MG tablet, Take 1 tablet by mouth 2 (Two) Times a Day., Disp: 20 tablet, Rfl: 0  •  diclofenac (VOLTAREN) 50 MG EC tablet, Take 1 tablet by mouth 2 (Two) Times a Day., Disp: 30 tablet, Rfl: 0  •  docusate sodium (Colace) 100 MG capsule, Take 1 capsule by mouth 2 (Two) Times a Day. If taking oxycodone, Disp: 15 capsule, Rfl: 1  •  doxycycline (MONODOX) 100 MG capsule, Take 1 capsule by mouth 2 (Two) Times a Day. (Patient not taking: Reported on 3/8/2022), Disp: 20 capsule, Rfl: 0  •  lisinopril (PRINIVIL,ZESTRIL) 40 MG tablet, Take 40 mg by mouth Daily., Disp: , Rfl:   •  PARoxetine (PAXIL) 20 MG tablet, , Disp: , Rfl:   •  predniSONE (DELTASONE) 20 MG tablet, Take 1 tablet by mouth 2 (Two) Times a Day., Disp: 10 tablet, Rfl: 0  •  ProAir  (90 Base) MCG/ACT inhaler, , Disp: , Rfl:   •  sildenafil (REVATIO) 20 MG tablet, Take 5 tablets by mouth Daily As Needed (1 hr prior to sexual  "activity). 1 hr prior to sexual activity, Disp: 60 tablet, Rfl: 11  •  simethicone (MYLICON) 80 MG chewable tablet, , Disp: , Rfl:      Physical Exam  Visit Vitals  /82 (BP Location: Left arm, Patient Position: Sitting, Cuff Size: Adult)   Pulse 88   Temp 97.3 °F (36.3 °C)   Ht 180.3 cm (71\")   Wt 101 kg (222 lb)   SpO2 93%   BMI 30.96 kg/m²     On genital exam patient has had dehiscence of the skin, with granulation tissue starting to form underneath.  The deep suture layers are still intact and the plication stitches are unable to be seen.  There is some erythema around the edges.    Labs  Brief Urine Lab Results     None          Lab Results   Component Value Date    GLUCOSE 101 (H) 03/08/2022    CALCIUM 9.2 03/08/2022     (L) 03/08/2022    K 4.5 03/08/2022    CO2 26.0 03/08/2022    CL 95 (L) 03/08/2022    BUN 12 03/08/2022    CREATININE 0.70 (L) 03/08/2022    EGFRIFAFRI 120 02/02/2020    EGFRIFNONA 96 10/04/2020    BCR 17.1 03/08/2022    ANIONGAP 12.0 03/08/2022       Lab Results   Component Value Date    WBC 9.97 03/08/2022    HGB 13.9 03/08/2022    HCT 41.0 03/08/2022    MCV 90.7 03/08/2022     03/08/2022            No results found for: PSA          Radiographic Studies  No Images in the past 120 days found..      I have reviewed above labs and imaging.     Assessment  66 y.o. male with likely wound infection and dehiscence after penile plication surgery.  Risk for complications are obesity.     Plan  1.  2 weeks Bactrim  2.  Reassurance.  I told him that the wound would heal by secondary intention and he would likely still have a straight erection as the stitches for the plication are still intact.  Follow-up in 8 weeks.    "

## 2022-04-30 ENCOUNTER — HOSPITAL ENCOUNTER (EMERGENCY)
Facility: HOSPITAL | Age: 67
Discharge: HOME | End: 2022-05-01
Attending: EMERGENCY MEDICINE | Admitting: SURGERY

## 2022-04-30 ENCOUNTER — ANESTHESIA EVENT (OUTPATIENT)
Dept: PERIOP | Facility: HOSPITAL | Age: 67
End: 2022-04-30

## 2022-04-30 ENCOUNTER — APPOINTMENT (OUTPATIENT)
Dept: CT IMAGING | Facility: HOSPITAL | Age: 67
End: 2022-04-30

## 2022-04-30 ENCOUNTER — ANESTHESIA (OUTPATIENT)
Dept: PERIOP | Facility: HOSPITAL | Age: 67
End: 2022-04-30

## 2022-04-30 DIAGNOSIS — K37 APPENDICITIS: ICD-10-CM

## 2022-04-30 DIAGNOSIS — K35.80 ACUTE APPENDICITIS, UNSPECIFIED ACUTE APPENDICITIS TYPE: Primary | ICD-10-CM

## 2022-04-30 LAB
ALBUMIN SERPL-MCNC: 4.5 G/DL (ref 3.5–5.2)
ALBUMIN/GLOB SERPL: 1.4 G/DL
ALP SERPL-CCNC: 69 U/L (ref 39–117)
ALT SERPL W P-5'-P-CCNC: 22 U/L (ref 1–41)
ANION GAP SERPL CALCULATED.3IONS-SCNC: 15.6 MMOL/L (ref 5–15)
AST SERPL-CCNC: 22 U/L (ref 1–40)
BASOPHILS # BLD AUTO: 0.02 10*3/MM3 (ref 0–0.2)
BASOPHILS NFR BLD AUTO: 0.1 % (ref 0–1.5)
BILIRUB SERPL-MCNC: 0.7 MG/DL (ref 0–1.2)
BILIRUB UR QL STRIP: NEGATIVE
BUN SERPL-MCNC: 17 MG/DL (ref 8–23)
BUN/CREAT SERPL: 19.5 (ref 7–25)
CALCIUM SPEC-SCNC: 9.4 MG/DL (ref 8.6–10.5)
CHLORIDE SERPL-SCNC: 92 MMOL/L (ref 98–107)
CLARITY UR: CLEAR
CO2 SERPL-SCNC: 24.4 MMOL/L (ref 22–29)
COLOR UR: YELLOW
CREAT SERPL-MCNC: 0.87 MG/DL (ref 0.76–1.27)
DEPRECATED RDW RBC AUTO: 40.1 FL (ref 37–54)
EGFRCR SERPLBLD CKD-EPI 2021: 96.4 ML/MIN/1.73
EOSINOPHIL # BLD AUTO: 0 10*3/MM3 (ref 0–0.4)
EOSINOPHIL NFR BLD AUTO: 0 % (ref 0.3–6.2)
ERYTHROCYTE [DISTWIDTH] IN BLOOD BY AUTOMATED COUNT: 12.8 % (ref 12.3–15.4)
ETHANOL BLD-MCNC: <10 MG/DL (ref 0–10)
ETHANOL UR QL: <0.01 %
GLOBULIN UR ELPH-MCNC: 3.2 GM/DL
GLUCOSE SERPL-MCNC: 162 MG/DL (ref 65–99)
GLUCOSE UR STRIP-MCNC: NEGATIVE MG/DL
HCT VFR BLD AUTO: 36.8 % (ref 37.5–51)
HGB BLD-MCNC: 13 G/DL (ref 13–17.7)
HGB UR QL STRIP.AUTO: NEGATIVE
IMM GRANULOCYTES # BLD AUTO: 0.1 10*3/MM3 (ref 0–0.05)
IMM GRANULOCYTES NFR BLD AUTO: 0.7 % (ref 0–0.5)
INR PPP: 1.01 (ref 0.9–1.1)
KETONES UR QL STRIP: ABNORMAL
LEUKOCYTE ESTERASE UR QL STRIP.AUTO: NEGATIVE
LIPASE SERPL-CCNC: 13 U/L (ref 13–60)
LYMPHOCYTES # BLD AUTO: 0.76 10*3/MM3 (ref 0.7–3.1)
LYMPHOCYTES NFR BLD AUTO: 5 % (ref 19.6–45.3)
MCH RBC QN AUTO: 30.6 PG (ref 26.6–33)
MCHC RBC AUTO-ENTMCNC: 35.3 G/DL (ref 31.5–35.7)
MCV RBC AUTO: 86.6 FL (ref 79–97)
MONOCYTES # BLD AUTO: 0.65 10*3/MM3 (ref 0.1–0.9)
MONOCYTES NFR BLD AUTO: 4.3 % (ref 5–12)
NEUTROPHILS NFR BLD AUTO: 13.55 10*3/MM3 (ref 1.7–7)
NEUTROPHILS NFR BLD AUTO: 89.9 % (ref 42.7–76)
NITRITE UR QL STRIP: NEGATIVE
NRBC BLD AUTO-RTO: 0 /100 WBC (ref 0–0.2)
PH UR STRIP.AUTO: 6 [PH] (ref 5–8)
PLATELET # BLD AUTO: 293 10*3/MM3 (ref 140–450)
PMV BLD AUTO: 8.9 FL (ref 6–12)
POTASSIUM SERPL-SCNC: 3.9 MMOL/L (ref 3.5–5.2)
PROT SERPL-MCNC: 7.7 G/DL (ref 6–8.5)
PROT UR QL STRIP: ABNORMAL
PROTHROMBIN TIME: 13.6 SECONDS (ref 12.5–14.5)
RBC # BLD AUTO: 4.25 10*6/MM3 (ref 4.14–5.8)
SARS-COV-2 RNA PNL SPEC NAA+PROBE: NOT DETECTED
SODIUM SERPL-SCNC: 132 MMOL/L (ref 136–145)
SP GR UR STRIP: 1.02 (ref 1–1.03)
UROBILINOGEN UR QL STRIP: ABNORMAL
WBC NRBC COR # BLD: 15.08 10*3/MM3 (ref 3.4–10.8)

## 2022-04-30 PROCEDURE — 25010000002 PIPERACILLIN SOD-TAZOBACTAM PER 1 G: Performed by: PHYSICIAN ASSISTANT

## 2022-04-30 PROCEDURE — 74177 CT ABD & PELVIS W/CONTRAST: CPT

## 2022-04-30 PROCEDURE — 25010000002 MIDAZOLAM PER 1MG: Performed by: NURSE ANESTHETIST, CERTIFIED REGISTERED

## 2022-04-30 PROCEDURE — 99284 EMERGENCY DEPT VISIT MOD MDM: CPT | Performed by: SURGERY

## 2022-04-30 PROCEDURE — 81003 URINALYSIS AUTO W/O SCOPE: CPT | Performed by: PHYSICIAN ASSISTANT

## 2022-04-30 PROCEDURE — 25010000002 PHENYLEPHRINE 10 MG/ML SOLUTION: Performed by: NURSE ANESTHETIST, CERTIFIED REGISTERED

## 2022-04-30 PROCEDURE — 83690 ASSAY OF LIPASE: CPT | Performed by: PHYSICIAN ASSISTANT

## 2022-04-30 PROCEDURE — 85610 PROTHROMBIN TIME: CPT | Performed by: PHYSICIAN ASSISTANT

## 2022-04-30 PROCEDURE — 25010000002 IOPAMIDOL 61 % SOLUTION: Performed by: EMERGENCY MEDICINE

## 2022-04-30 PROCEDURE — 25010000002 SUCCINYLCHOLINE PER 20 MG: Performed by: NURSE ANESTHETIST, CERTIFIED REGISTERED

## 2022-04-30 PROCEDURE — 25010000002 LORAZEPAM PER 2 MG: Performed by: EMERGENCY MEDICINE

## 2022-04-30 PROCEDURE — 82077 ASSAY SPEC XCP UR&BREATH IA: CPT | Performed by: PHYSICIAN ASSISTANT

## 2022-04-30 PROCEDURE — 25010000002 KETOROLAC TROMETHAMINE PER 15 MG: Performed by: PHYSICIAN ASSISTANT

## 2022-04-30 PROCEDURE — 25010000002 ERTAPENEM PER 500 MG

## 2022-04-30 PROCEDURE — 99284 EMERGENCY DEPT VISIT MOD MDM: CPT

## 2022-04-30 PROCEDURE — C9803 HOPD COVID-19 SPEC COLLECT: HCPCS

## 2022-04-30 PROCEDURE — 96375 TX/PRO/DX INJ NEW DRUG ADDON: CPT

## 2022-04-30 PROCEDURE — 25010000002 MORPHINE PER 10 MG: Performed by: EMERGENCY MEDICINE

## 2022-04-30 PROCEDURE — 44970 LAPAROSCOPY APPENDECTOMY: CPT | Performed by: SURGERY

## 2022-04-30 PROCEDURE — 25010000002 FENTANYL CITRATE (PF) 100 MCG/2ML SOLUTION: Performed by: NURSE ANESTHETIST, CERTIFIED REGISTERED

## 2022-04-30 PROCEDURE — 80053 COMPREHEN METABOLIC PANEL: CPT | Performed by: PHYSICIAN ASSISTANT

## 2022-04-30 PROCEDURE — 25010000002 ONDANSETRON PER 1 MG: Performed by: EMERGENCY MEDICINE

## 2022-04-30 PROCEDURE — 25010000002 MORPHINE SULFATE (PF) 2 MG/ML SOLUTION: Performed by: FAMILY MEDICINE

## 2022-04-30 PROCEDURE — 96376 TX/PRO/DX INJ SAME DRUG ADON: CPT

## 2022-04-30 PROCEDURE — 25010000002 PROPOFOL 200 MG/20ML EMULSION: Performed by: NURSE ANESTHETIST, CERTIFIED REGISTERED

## 2022-04-30 PROCEDURE — 85025 COMPLETE CBC W/AUTO DIFF WBC: CPT | Performed by: PHYSICIAN ASSISTANT

## 2022-04-30 PROCEDURE — 96365 THER/PROPH/DIAG IV INF INIT: CPT

## 2022-04-30 PROCEDURE — 87635 SARS-COV-2 COVID-19 AMP PRB: CPT | Performed by: PHYSICIAN ASSISTANT

## 2022-04-30 PROCEDURE — 25010000002 ONDANSETRON PER 1 MG: Performed by: NURSE ANESTHETIST, CERTIFIED REGISTERED

## 2022-04-30 DEVICE — LIGACLIP 10-M/L, 10MM ENDOSCOPIC ROTATING MULTIPLE CLIP APPLIERS
Type: IMPLANTABLE DEVICE | Site: ABDOMEN | Status: FUNCTIONAL
Brand: LIGACLIP

## 2022-04-30 DEVICE — ENDOPATH ETS45 2.5MM RELOADS (VASCULAR/THIN)
Type: IMPLANTABLE DEVICE | Site: ABDOMEN | Status: FUNCTIONAL
Brand: ENDOPATH

## 2022-04-30 RX ORDER — HYDROCODONE BITARTRATE AND ACETAMINOPHEN 7.5; 325 MG/1; MG/1
1 TABLET ORAL EVERY 6 HOURS PRN
Qty: 15 TABLET | Refills: 0 | Status: SHIPPED | OUTPATIENT
Start: 2022-04-30

## 2022-04-30 RX ORDER — FENTANYL CITRATE 50 UG/ML
INJECTION, SOLUTION INTRAMUSCULAR; INTRAVENOUS AS NEEDED
Status: DISCONTINUED | OUTPATIENT
Start: 2022-04-30 | End: 2022-04-30 | Stop reason: SURG

## 2022-04-30 RX ORDER — ONDANSETRON 2 MG/ML
4 INJECTION INTRAMUSCULAR; INTRAVENOUS ONCE
Status: COMPLETED | OUTPATIENT
Start: 2022-04-30 | End: 2022-04-30

## 2022-04-30 RX ORDER — KETOROLAC TROMETHAMINE 30 MG/ML
15 INJECTION, SOLUTION INTRAMUSCULAR; INTRAVENOUS ONCE
Status: COMPLETED | OUTPATIENT
Start: 2022-04-30 | End: 2022-04-30

## 2022-04-30 RX ORDER — AMOXICILLIN AND CLAVULANATE POTASSIUM 875; 125 MG/1; MG/1
1 TABLET, FILM COATED ORAL 2 TIMES DAILY
Qty: 10 TABLET | Refills: 0 | Status: SHIPPED | OUTPATIENT
Start: 2022-04-30 | End: 2022-05-05

## 2022-04-30 RX ORDER — LIDOCAINE HYDROCHLORIDE 20 MG/ML
INJECTION, SOLUTION INTRAVENOUS AS NEEDED
Status: DISCONTINUED | OUTPATIENT
Start: 2022-04-30 | End: 2022-04-30 | Stop reason: SURG

## 2022-04-30 RX ORDER — MIDAZOLAM HYDROCHLORIDE 2 MG/2ML
INJECTION, SOLUTION INTRAMUSCULAR; INTRAVENOUS AS NEEDED
Status: DISCONTINUED | OUTPATIENT
Start: 2022-04-30 | End: 2022-04-30 | Stop reason: SURG

## 2022-04-30 RX ORDER — ONDANSETRON 4 MG/1
4 TABLET, FILM COATED ORAL ONCE AS NEEDED
Status: DISCONTINUED | OUTPATIENT
Start: 2022-04-30 | End: 2022-05-01 | Stop reason: HOSPADM

## 2022-04-30 RX ORDER — NEOSTIGMINE METHYLSULFATE 5 MG/5 ML
SYRINGE (ML) INTRAVENOUS AS NEEDED
Status: DISCONTINUED | OUTPATIENT
Start: 2022-04-30 | End: 2022-04-30 | Stop reason: SURG

## 2022-04-30 RX ORDER — ROCURONIUM BROMIDE 10 MG/ML
INJECTION, SOLUTION INTRAVENOUS AS NEEDED
Status: DISCONTINUED | OUTPATIENT
Start: 2022-04-30 | End: 2022-04-30 | Stop reason: SURG

## 2022-04-30 RX ORDER — IBUPROFEN 600 MG/1
600 TABLET ORAL EVERY 6 HOURS PRN
Status: DISCONTINUED | OUTPATIENT
Start: 2022-04-30 | End: 2022-05-01 | Stop reason: HOSPADM

## 2022-04-30 RX ORDER — MORPHINE SULFATE 2 MG/ML
2 INJECTION, SOLUTION INTRAMUSCULAR; INTRAVENOUS ONCE
Status: COMPLETED | OUTPATIENT
Start: 2022-04-30 | End: 2022-04-30

## 2022-04-30 RX ORDER — SODIUM CHLORIDE 0.9 % (FLUSH) 0.9 %
10 SYRINGE (ML) INJECTION AS NEEDED
Status: DISCONTINUED | OUTPATIENT
Start: 2022-04-30 | End: 2022-05-01 | Stop reason: HOSPADM

## 2022-04-30 RX ORDER — BUPIVACAINE HYDROCHLORIDE AND EPINEPHRINE 5; 5 MG/ML; UG/ML
INJECTION, SOLUTION EPIDURAL; INTRACAUDAL; PERINEURAL AS NEEDED
Status: DISCONTINUED | OUTPATIENT
Start: 2022-04-30 | End: 2022-04-30 | Stop reason: HOSPADM

## 2022-04-30 RX ORDER — ONDANSETRON 2 MG/ML
INJECTION INTRAMUSCULAR; INTRAVENOUS AS NEEDED
Status: DISCONTINUED | OUTPATIENT
Start: 2022-04-30 | End: 2022-04-30 | Stop reason: SURG

## 2022-04-30 RX ORDER — PHENYLEPHRINE HYDROCHLORIDE 10 MG/ML
INJECTION INTRAVENOUS AS NEEDED
Status: DISCONTINUED | OUTPATIENT
Start: 2022-04-30 | End: 2022-04-30 | Stop reason: SURG

## 2022-04-30 RX ORDER — ONDANSETRON 2 MG/ML
4 INJECTION INTRAMUSCULAR; INTRAVENOUS ONCE AS NEEDED
Status: DISCONTINUED | OUTPATIENT
Start: 2022-04-30 | End: 2022-05-01 | Stop reason: HOSPADM

## 2022-04-30 RX ORDER — PROPOFOL 10 MG/ML
INJECTION, EMULSION INTRAVENOUS AS NEEDED
Status: DISCONTINUED | OUTPATIENT
Start: 2022-04-30 | End: 2022-04-30 | Stop reason: SURG

## 2022-04-30 RX ORDER — LORAZEPAM 2 MG/ML
1 INJECTION INTRAMUSCULAR ONCE
Status: COMPLETED | OUTPATIENT
Start: 2022-04-30 | End: 2022-04-30

## 2022-04-30 RX ORDER — SODIUM CHLORIDE, SODIUM LACTATE, POTASSIUM CHLORIDE, CALCIUM CHLORIDE 600; 310; 30; 20 MG/100ML; MG/100ML; MG/100ML; MG/100ML
INJECTION, SOLUTION INTRAVENOUS CONTINUOUS PRN
Status: DISCONTINUED | OUTPATIENT
Start: 2022-04-30 | End: 2022-04-30 | Stop reason: SURG

## 2022-04-30 RX ORDER — ACETAMINOPHEN 500 MG
1000 TABLET ORAL ONCE
Status: COMPLETED | OUTPATIENT
Start: 2022-04-30 | End: 2022-04-30

## 2022-04-30 RX ORDER — SUCCINYLCHOLINE CHLORIDE 20 MG/ML
INJECTION INTRAMUSCULAR; INTRAVENOUS AS NEEDED
Status: DISCONTINUED | OUTPATIENT
Start: 2022-04-30 | End: 2022-04-30 | Stop reason: SURG

## 2022-04-30 RX ORDER — MORPHINE SULFATE 4 MG/ML
4 INJECTION, SOLUTION INTRAMUSCULAR; INTRAVENOUS ONCE
Status: COMPLETED | OUTPATIENT
Start: 2022-04-30 | End: 2022-04-30

## 2022-04-30 RX ADMIN — ROCURONIUM BROMIDE 5 MG: 10 INJECTION INTRAVENOUS at 21:29

## 2022-04-30 RX ADMIN — SODIUM CHLORIDE 1000 ML: 9 INJECTION, SOLUTION INTRAVENOUS at 19:28

## 2022-04-30 RX ADMIN — PHENYLEPHRINE HYDROCHLORIDE 300 MCG: 10 INJECTION INTRAVENOUS at 21:41

## 2022-04-30 RX ADMIN — PROPOFOL 200 MG: 10 INJECTION, EMULSION INTRAVENOUS at 21:29

## 2022-04-30 RX ADMIN — LORAZEPAM 1 MG: 2 INJECTION INTRAMUSCULAR; INTRAVENOUS at 17:20

## 2022-04-30 RX ADMIN — Medication 1 G: at 22:24

## 2022-04-30 RX ADMIN — ACETAMINOPHEN 1000 MG: 500 TABLET ORAL at 22:59

## 2022-04-30 RX ADMIN — ONDANSETRON 4 MG: 2 INJECTION INTRAMUSCULAR; INTRAVENOUS at 16:28

## 2022-04-30 RX ADMIN — GLYCOPYRROLATE 0.4 MG: 0.2 INJECTION, SOLUTION INTRAMUSCULAR; INTRAVENOUS at 21:56

## 2022-04-30 RX ADMIN — FENTANYL CITRATE 100 MCG: 50 INJECTION INTRAMUSCULAR; INTRAVENOUS at 21:47

## 2022-04-30 RX ADMIN — SODIUM CHLORIDE, POTASSIUM CHLORIDE, SODIUM LACTATE AND CALCIUM CHLORIDE: 600; 310; 30; 20 INJECTION, SOLUTION INTRAVENOUS at 21:24

## 2022-04-30 RX ADMIN — TAZOBACTAM SODIUM AND PIPERACILLIN SODIUM 3.38 G: 375; 3 INJECTION, SOLUTION INTRAVENOUS at 19:28

## 2022-04-30 RX ADMIN — ONDANSETRON 4 MG: 2 INJECTION INTRAMUSCULAR; INTRAVENOUS at 21:55

## 2022-04-30 RX ADMIN — FENTANYL CITRATE 100 MCG: 50 INJECTION INTRAMUSCULAR; INTRAVENOUS at 21:29

## 2022-04-30 RX ADMIN — Medication 3 MG: at 21:56

## 2022-04-30 RX ADMIN — KETOROLAC TROMETHAMINE 15 MG: 30 INJECTION, SOLUTION INTRAMUSCULAR at 15:46

## 2022-04-30 RX ADMIN — SUCCINYLCHOLINE CHLORIDE 180 MG: 20 INJECTION, SOLUTION INTRAMUSCULAR; INTRAVENOUS at 21:29

## 2022-04-30 RX ADMIN — MORPHINE SULFATE 4 MG: 4 INJECTION, SOLUTION INTRAMUSCULAR; INTRAVENOUS at 16:29

## 2022-04-30 RX ADMIN — MIDAZOLAM HYDROCHLORIDE 2 MG: 1 INJECTION, SOLUTION INTRAMUSCULAR; INTRAVENOUS at 21:29

## 2022-04-30 RX ADMIN — SODIUM CHLORIDE 1000 ML: 9 INJECTION, SOLUTION INTRAVENOUS at 15:46

## 2022-04-30 RX ADMIN — MORPHINE SULFATE 2 MG: 2 INJECTION, SOLUTION INTRAMUSCULAR; INTRAVENOUS at 20:06

## 2022-04-30 RX ADMIN — SODIUM CHLORIDE 1000 ML: 9 INJECTION, SOLUTION INTRAVENOUS at 23:15

## 2022-04-30 RX ADMIN — LIDOCAINE HYDROCHLORIDE 100 MG: 20 INJECTION, SOLUTION INTRAVENOUS at 21:29

## 2022-04-30 RX ADMIN — ROCURONIUM BROMIDE 20 MG: 10 INJECTION INTRAVENOUS at 21:38

## 2022-04-30 RX ADMIN — IOPAMIDOL 100 ML: 612 INJECTION, SOLUTION INTRAVENOUS at 18:13

## 2022-05-01 VITALS
HEIGHT: 70 IN | HEART RATE: 86 BPM | OXYGEN SATURATION: 97 % | BODY MASS INDEX: 29.2 KG/M2 | SYSTOLIC BLOOD PRESSURE: 99 MMHG | RESPIRATION RATE: 20 BRPM | WEIGHT: 204 LBS | TEMPERATURE: 98.3 F | DIASTOLIC BLOOD PRESSURE: 64 MMHG

## 2022-05-01 NOTE — OP NOTE
PATIENT:    Arvind Vinson    DATE OF SURGERY:   4/30/2022    PHYSICIAN:    Dusty Nava MD    REFERRING PHYSICIAN:  No ref. provider found    YOB: 1955    PREOPERATIVE DIAGNOSIS:  Acute appendicitis    POSTOPERATIVE DIAGNOSIS:  Acute appendicitis with abscess    PROCEDURE:  Laparoscopic appendectomy with drainage of abscess    EBL:  Less than 50 cc    COMPLICATIONS:  None    OPERATIVE PROCEDURE:  The patient was taken to the operating room in the normal manner and given general endotracheal anesthesia.  The patient was also given preoperative IV antibiotics.  I did discuss the situation with the patient preoperatively and I marked them accordingly.  An appropriate timeout was performed intraoperatively prior to the incision.      A supraumbilical incision was made to insert a Veress needle to insufflate the abdomen with CO2.  A 5 mm Optiview was inserted here and good visualization was obtained.  A separate suprapubic 5 mm Optiview was inserted along with the left lower quadrant 12 mm Optiview.      The appendix was found to be in the right lower quadrant and was dissected free. There was noted to be fairly severe inflammation and surrounding fluid. It was grasped with graspers without teeth.  The appendiceal mesentery was dissected free and then divided with an Endo-GI stapling device as was the base of the appendix itself.  The appendix was placed in the Endobag and removed via the left lower quadrant 12 mm port site.    There was noted to be fairly severe inflammation in the right lower quadrant and there was a abscess cavity with fluid noted in the right gutter.  This was carefully drained and copious irrigation was used to clean out this area. We used 2 liters of saline and suctioned 1800 cc of fluid after irrigation.  I felt like the pelvis, right gutter, space above the liver, and pericecal region were clean and dry at this time    Clips were placed on the mesentery for further  hemostasis, and copious irrigation was used in the patient’s abdominal cavity.  It was clean and dry at this point in time.  All trocars were injected with Marcaine for postoperative anesthetic and then removed under direct visualization.  0-Vicryl suture was used to close the fascia and 4-0 subcuticular stitch and Steri-Strips were used to close the skin.  The patient was stable at this point in time and subsequently transferred back to the recovery room in stable condition.     Dusty Nava MD  4/30/2022  22:04 EDT

## 2022-05-01 NOTE — ANESTHESIA PREPROCEDURE EVALUATION
Anesthesia Evaluation     Patient summary reviewed and Nursing notes reviewed   NPO Solid Status: Waived due to emergency  NPO Liquid Status: Waived due to emergency           Airway   Mallampati: I  TM distance: >3 FB  Neck ROM: full  Dental    (+) poor dentition    Pulmonary - negative pulmonary ROS    breath sounds clear to auscultation  Cardiovascular   Exercise tolerance: good (4-7 METS)    Rhythm: regular  Rate: normal    (+) hypertension,       Neuro/Psych  (+) psychiatric history Anxiety and Depression,    GI/Hepatic/Renal/Endo - negative ROS     Musculoskeletal (-) negative ROS    Abdominal    Substance History   (+) alcohol use,      OB/GYN          Other - negative ROS                       Anesthesia Plan    ASA 2 - emergent     general   Rapid sequence  intravenous induction     Anesthetic plan, all risks, benefits, and alternatives have been provided, discussed and informed consent has been obtained with: patient.        CODE STATUS:

## 2022-05-01 NOTE — ANESTHESIA PROCEDURE NOTES
Airway  Urgency: elective    Date/Time: 4/30/2022 9:29 PM  Airway not difficult    General Information and Staff    Patient location during procedure: OR  CRNA/CAA: Mychal Heath CRNA    Indications and Patient Condition  Indications for airway management: airway protection    Preoxygenated: yes  Mask difficulty assessment: 0 - not attempted    Final Airway Details  Final airway type: endotracheal airway      Successful airway: ETT  Cuffed: yes   Successful intubation technique: direct laryngoscopy and RSI  Facilitating devices/methods: cricoid pressure  Endotracheal tube insertion site: oral  Blade: Fox  Blade size: 2  ETT size (mm): 7.5  Cormack-Lehane Classification: grade I - full view of glottis  Placement verified by: chest auscultation and capnometry   Measured from: lips  ETT/EBT  to lips (cm): 23  Number of attempts at approach: 1  Assessment: lips, teeth, and gum same as pre-op and atraumatic intubation

## 2022-05-01 NOTE — DISCHARGE INSTRUCTIONS
Rest today  No pushing,pulling,tugging,heavy lifting, or strenuous activity   No major decision making,driving,or drinking alcoholic beverages for 24 hours due to the sedation you received  Always use good hand hygiene/washing technique    No driving on pain medication. To assist you in voiding:  Drink plenty of fluids  Listen to running water while attempting to void.    If you are unable to urinate and you have an uncomfortable urge to void or it has been   6 hours since you were discharged, return to the Emergency Room

## 2022-05-01 NOTE — H&P
Reason for Consultation: Appendicitis      Chief complaint:  Abdominal pain    SUBJECTIVE:    History of present illness:  I did see the patient in the ER today as a consultation for evaluation and treatment of 24-hour history of increasing right lower quadrant abdominal discomfort.  It is now sharp in nature, located in the right lower quadrant, associated with nausea, worse with movement, not improved, nonradiating.  He is never felt like this in the past.    Review of Systems:    Review of Systems - General ROS: negative for - chills, fatigue, fever, hot flashes, malaise or night sweats  Psychological ROS: negative for - behavioral disorder, disorientation, hallucinations, hostility or mood swings  ENT ROS: negative for - nasal polyps, oral lesions, sinus pain, sneezing or sore throat  Breast ROS: negative for - galactorrhea or new or changing breast lumps  Respiratory ROS: negative for - hemoptysis, orthopnea, pleuritic pain, sputum changes or stridor  Cardiovascular ROS: negative for - dyspnea on exertion, edema, irregular heartbeat, murmur, orthopnea, palpitations or rapid heart rate  Gastrointestinal ROS: negative for - change in stools, gas/bloating, hematemesis, melena or stool incontinence. There is a history of nausea with right lower quadrant pain  Genito-Urinary ROS: negative for - dysuria, genital ulcers, nocturia or pelvic pain  Musculoskeletal ROS: negative for - gait disturbance or muscle pain  Neurological ROS: negative for - dizziness, gait disturbance, memory loss, numbness/tingling or seizures      Allergies:  No Known Allergies    Medications:    Current Facility-Administered Medications:   •  [COMPLETED] Insert peripheral IV, , , Once **AND** sodium chloride 0.9 % flush 10 mL, 10 mL, Intravenous, PRN, Cortes Portillo PA-C    Current Outpatient Medications:   •  buprenorphine-naloxone (SUBOXONE) 8-2 MG per SL tablet, , Disp: , Rfl:   •  busPIRone (BUSPAR) 10 MG tablet, Take 1  tablet by mouth 2 (Two) Times a Day., Disp: 20 tablet, Rfl: 0  •  diclofenac (VOLTAREN) 50 MG EC tablet, Take 1 tablet by mouth 2 (Two) Times a Day., Disp: 30 tablet, Rfl: 0  •  docusate sodium (Colace) 100 MG capsule, Take 1 capsule by mouth 2 (Two) Times a Day. If taking oxycodone, Disp: 15 capsule, Rfl: 1  •  doxycycline (MONODOX) 100 MG capsule, Take 1 capsule by mouth 2 (Two) Times a Day. (Patient not taking: Reported on 3/8/2022), Disp: 20 capsule, Rfl: 0  •  lisinopril (PRINIVIL,ZESTRIL) 40 MG tablet, Take 40 mg by mouth Daily., Disp: , Rfl:   •  PARoxetine (PAXIL) 20 MG tablet, , Disp: , Rfl:   •  predniSONE (DELTASONE) 20 MG tablet, Take 1 tablet by mouth 2 (Two) Times a Day., Disp: 10 tablet, Rfl: 0  •  ProAir  (90 Base) MCG/ACT inhaler, , Disp: , Rfl:   •  sildenafil (REVATIO) 20 MG tablet, Take 5 tablets by mouth Daily As Needed (1 hr prior to sexual activity). 1 hr prior to sexual activity, Disp: 60 tablet, Rfl: 11  •  simethicone (MYLICON) 80 MG chewable tablet, , Disp: , Rfl:     History:  Past Medical History:   Diagnosis Date   • Anxiety    • COVID-19 vaccine administered     Muna/Moderna.  2 vaccines   • Depression    • H/O exercise stress test    • Hypertension        Past Surgical History:   Procedure Laterality Date   • FOOT SURGERY     • PENIS PLAQUE EXCISION WITH CORPORAL PLICATION N/A 3/22/2022    Procedure: PENIS PLAQUE EXCISION WITH CORPORAL PLICATION;  Surgeon: Kal Collier MD;  Location: [facility];  Service: Urology;  Laterality: N/A;       Family History   Problem Relation Age of Onset   • Hypertension Mother    • Hypertension Father    • Hypertension Sister    • Heart disease Brother        Social History     Tobacco Use   • Smoking status: Never Smoker   • Smokeless tobacco: Current User     Types: Snuff   • Tobacco comment: pt reports used this am 3/22/22 at 1000   Vaping Use   • Vaping Use: Never used   Substance Use Topics   • Alcohol use: Yes     Alcohol/week:  "4.0 standard drinks     Types: 4 Cans of beer per week     Comment: ETOH REHAB  6 WEEKS AGO; DRINKS beers 7-8 A WEEK NOW   • Drug use: Not Currently     Types: Hydrocodone     Comment: pt reports \"4 years ago\"          OBJECTIVE:    Vital Signs   Temp:  [99.2 °F (37.3 °C)] 99.2 °F (37.3 °C)  Heart Rate:  [] 97  Resp:  [18] 18  BP: (100-121)/(59-85) 102/62    Physical Exam:     General Appearance:    Alert, cooperative, in no acute distress   Head:    Normocephalic, without obvious abnormality, atraumatic   Eyes:            Lids and lashes normal, conjunctivae and sclerae normal, no   icterus, no pallor, corneas clear, PERRLA   Ears:    Ears appear intact with no abnormalities noted   Throat:   No oral lesions, no thrush, oral mucosa moist   Neck:   No adenopathy, supple, trachea midline, no thyromegaly, no   carotid bruit, no JVD   Back:     No kyphosis present, no scoliosis present, no skin lesions,      erythema or scars, no tenderness to percussion or                   palpation,   range of motion normal   Lungs:     Clear to auscultation,respirations regular, even and                  unlabored    Heart:    Regular rhythm and normal rate, normal S1 and S2, no            murmur, no gallop, no rub, no click   Chest Wall:    No abnormalities observed   Abdomen:     Normal bowel sounds, no masses, no organomegaly, soft        non-tender, non-distended, no guarding, there is evidence of right lower quadrant tenderness   Extremities:   Moves all extremities well, no edema, no cyanosis, no             redness   Pulses:   Pulses palpable and equal bilaterally   Skin:   No bleeding, bruising or rash   Lymph nodes:   No palpable adenopathy   Neurologic:   Cranial nerves 2 - 12 grossly intact, sensation intact, DTR       present and equal bilaterally       Results Review:   I reviewed the patient's new clinical results.  I reviewed the patient's new imaging results and agree with the interpretation.  I reviewed the " patient's other test results and agree with the interpretation      ASSESSMENT/PLAN:    Acute appendicitis    I did have a detailed and extensive discussion with the patient in the hospital and they understand that they need to undergo laparoscopic appendectomy or possible open appendectomy. Full risks and benefits of operative versus nonoperative intervention were discussed with the patient and these included things such as nonresolution of symptoms and possible worsening of symptoms without surgical intervention versus infection, bleeding, open appendectomy, postoperative abscess, etc with surgical intervention. The patient understands, agrees, and wishes to proceed with the surgical treatment plan as mentioned above. The patient had no questions for me at the end of the discussion.      I discussed the patients findings and my recommendations with patient and consulting provider.        Dusty Nava MD  04/30/22

## 2022-05-01 NOTE — DISCHARGE SUMMARY
24 hour discharge summary    Discharge home  Advance diet slowly  Rx :  Norco and Augmentin  Ice to wound x 24 hours  May shower  No heavy lifting  F/U with Brandy IRELAND in 2-3 weeks  Resume home medications

## 2022-05-05 LAB
LAB AP CASE REPORT: NORMAL
PATH REPORT.FINAL DX SPEC: NORMAL

## 2022-05-26 NOTE — ANESTHESIA POSTPROCEDURE EVALUATION
Patient: Arvind Vinson    Procedure Summary     Date: 04/30/22 Room / Location: [facility] OR 4 /  [facility]    Anesthesia Start: 2124 Anesthesia Stop: 2206    Procedure: APPENDECTOMY LAPAROSCOPIC (N/A Abdomen) Diagnosis:       Appendicitis      (appendicitis)    Surgeons: Dusty Nava MD Provider: Mychal Heath CRNA    Anesthesia Type: general ASA Status: 2 - Emergent          Anesthesia Type: general    Vitals  Vitals Value Taken Time   BP 99/64 05/01/22 0100   Temp 98.3 °F (36.8 °C) 05/01/22 0100   Pulse 86 05/01/22 0100   Resp 20 05/01/22 0100   SpO2 97 % 05/01/22 0100           Post Anesthesia Care and Evaluation    Patient location during evaluation: PACU  Patient participation: complete - patient participated  Level of consciousness: awake  Pain score: 2  Pain management: adequate  Airway patency: patent  Anesthetic complications: No anesthetic complications  PONV Status: none  Cardiovascular status: acceptable  Respiratory status: acceptable  Hydration status: acceptable  No anesthesia care post op

## 2022-06-02 ENCOUNTER — OFFICE VISIT (OUTPATIENT)
Dept: UROLOGY | Facility: CLINIC | Age: 67
End: 2022-06-02

## 2022-06-02 VITALS
BODY MASS INDEX: 29.2 KG/M2 | TEMPERATURE: 97.8 F | HEIGHT: 70 IN | OXYGEN SATURATION: 95 % | SYSTOLIC BLOOD PRESSURE: 142 MMHG | WEIGHT: 204 LBS | DIASTOLIC BLOOD PRESSURE: 42 MMHG | HEART RATE: 84 BPM

## 2022-06-02 DIAGNOSIS — N52.9 ERECTILE DYSFUNCTION, UNSPECIFIED ERECTILE DYSFUNCTION TYPE: ICD-10-CM

## 2022-06-02 PROCEDURE — 99214 OFFICE O/P EST MOD 30 MIN: CPT | Performed by: UROLOGY

## 2022-06-02 RX ORDER — SILDENAFIL CITRATE 20 MG/1
100 TABLET ORAL DAILY PRN
Qty: 60 TABLET | Refills: 11 | Status: SHIPPED | OUTPATIENT
Start: 2022-06-02

## 2022-06-02 NOTE — PROGRESS NOTES
Chief Complaint   Patient presents with   • Follow-up        HPI  Ms. Vinson is a 67 y.o. male with history below in assessment, who presents for follow up.     At this visit patient states that his erections are straight now.  He desires sildenafil for erectile dysfunction.    Past Medical History:   Diagnosis Date   • Anxiety    • COVID-19 vaccine administered     Muna/Moderna.  2 vaccines   • Depression    • H/O exercise stress test    • Hypertension        Past Surgical History:   Procedure Laterality Date   • APPENDECTOMY N/A 4/30/2022    Procedure: APPENDECTOMY LAPAROSCOPIC;  Surgeon: Dusty Nava MD;  Location: [facility] OR;  Service: General;  Laterality: N/A;   • FOOT SURGERY     • PENIS PLAQUE EXCISION WITH CORPORAL PLICATION N/A 3/22/2022    Procedure: PENIS PLAQUE EXCISION WITH CORPORAL PLICATION;  Surgeon: Kal Collier MD;  Location: [facility] OR;  Service: Urology;  Laterality: N/A;         Current Outpatient Medications:   •  buprenorphine-naloxone (SUBOXONE) 8-2 MG per SL tablet, , Disp: , Rfl:   •  busPIRone (BUSPAR) 10 MG tablet, Take 1 tablet by mouth 2 (Two) Times a Day., Disp: 20 tablet, Rfl: 0  •  diclofenac (VOLTAREN) 50 MG EC tablet, Take 1 tablet by mouth 2 (Two) Times a Day., Disp: 30 tablet, Rfl: 0  •  docusate sodium (Colace) 100 MG capsule, Take 1 capsule by mouth 2 (Two) Times a Day. If taking oxycodone, Disp: 15 capsule, Rfl: 1  •  doxycycline (MONODOX) 100 MG capsule, Take 1 capsule by mouth 2 (Two) Times a Day., Disp: 20 capsule, Rfl: 0  •  HYDROcodone-acetaminophen (NORCO) 7.5-325 MG per tablet, Take 1 tablet by mouth Every 6 (Six) Hours As Needed for Moderate Pain  (Pain)., Disp: 15 tablet, Rfl: 0  •  lisinopril (PRINIVIL,ZESTRIL) 40 MG tablet, Take 40 mg by mouth Daily., Disp: , Rfl:   •  PARoxetine (PAXIL) 20 MG tablet, , Disp: , Rfl:   •  predniSONE (DELTASONE) 20 MG tablet, Take 1 tablet by mouth 2 (Two) Times a Day., Disp: 10 tablet, Rfl: 0  •  ProAir  (90  "Base) MCG/ACT inhaler, , Disp: , Rfl:   •  sildenafil (REVATIO) 20 MG tablet, Take 5 tablets by mouth Daily As Needed (1 hr prior to sexual activity). 1 hr prior to sexual activity, Disp: 60 tablet, Rfl: 11  •  simethicone (MYLICON) 80 MG chewable tablet, , Disp: , Rfl:      Physical Exam  Visit Vitals  /42 (BP Location: Right arm, Patient Position: Sitting, Cuff Size: Adult)   Pulse 84   Temp 97.8 °F (36.6 °C) (Temporal)   Ht 177.8 cm (70\")   Wt 92.5 kg (204 lb)   SpO2 95%   BMI 29.27 kg/m²     On physical exam surgical incision all well-healed.    Labs  Brief Urine Lab Results  (Last result in the past 365 days)      Color   Clarity   Blood   Leuk Est   Nitrite   Protein   CREAT   Urine HCG        04/30/22 1659 Yellow   Clear   Negative   Negative   Negative   Trace                 Lab Results   Component Value Date    GLUCOSE 162 (H) 04/30/2022    CALCIUM 9.4 04/30/2022     (L) 04/30/2022    K 3.9 04/30/2022    CO2 24.4 04/30/2022    CL 92 (L) 04/30/2022    BUN 17 04/30/2022    CREATININE 0.87 04/30/2022    EGFRIFAFRI 120 02/02/2020    EGFRIFNONA 96 10/04/2020    BCR 19.5 04/30/2022    ANIONGAP 15.6 (H) 04/30/2022       Lab Results   Component Value Date    WBC 15.08 (H) 04/30/2022    HGB 13.0 04/30/2022    HCT 36.8 (L) 04/30/2022    MCV 86.6 04/30/2022     04/30/2022            No results found for: PSA          Radiographic Studies  CT Abdomen Pelvis With Contrast    Result Date: 4/30/2022  Findings are compatible with acute appendicitis. Authenticated by Lavon Henning MD on 04/30/2022 07:00:50 PM      I have reviewed above labs and imaging.     Assessment  67 y.o. male with erectile dysfunction and Peyronies disease.  Peyronies disease successfully treated with penile plication.  Erectile dysfunction is chronic and has worsened.  It is likely secondary to Suboxone usage and hypertension.    Plan  1.  Sildenafil refilled  2.  Follow-up as needed    "

## 2023-04-28 ENCOUNTER — TRANSCRIBE ORDERS (OUTPATIENT)
Dept: LAB | Facility: HOSPITAL | Age: 68
End: 2023-04-28
Payer: MEDICARE

## 2023-04-28 ENCOUNTER — LAB (OUTPATIENT)
Dept: LAB | Facility: HOSPITAL | Age: 68
End: 2023-04-28
Payer: MEDICARE

## 2023-04-28 DIAGNOSIS — D64.9 ANEMIA, UNSPECIFIED TYPE: ICD-10-CM

## 2023-04-28 DIAGNOSIS — D64.9 ANEMIA, UNSPECIFIED TYPE: Primary | ICD-10-CM

## 2023-04-28 DIAGNOSIS — I10 HTN (HYPERTENSION) WITH GOAL TO BE DETERMINED: ICD-10-CM

## 2023-04-28 DIAGNOSIS — E78.5 HYPERLIPIDEMIA, UNSPECIFIED HYPERLIPIDEMIA TYPE: ICD-10-CM

## 2023-04-28 DIAGNOSIS — Z12.5 SPECIAL SCREENING FOR MALIGNANT NEOPLASM OF PROSTATE: Primary | ICD-10-CM

## 2023-04-28 DIAGNOSIS — Z12.5 SPECIAL SCREENING FOR MALIGNANT NEOPLASM OF PROSTATE: ICD-10-CM

## 2023-04-28 LAB
ALBUMIN SERPL-MCNC: 4.6 G/DL (ref 3.5–5.2)
ALBUMIN/GLOB SERPL: 1.4 G/DL
ALP SERPL-CCNC: 144 U/L (ref 39–117)
ALT SERPL W P-5'-P-CCNC: 42 U/L (ref 1–41)
ANION GAP SERPL CALCULATED.3IONS-SCNC: 8 MMOL/L (ref 5–15)
AST SERPL-CCNC: 32 U/L (ref 1–40)
BASOPHILS # BLD AUTO: 0.05 10*3/MM3 (ref 0–0.2)
BASOPHILS NFR BLD AUTO: 0.9 % (ref 0–1.5)
BILIRUB SERPL-MCNC: 0.3 MG/DL (ref 0–1.2)
BUN SERPL-MCNC: 24 MG/DL (ref 8–23)
BUN/CREAT SERPL: 29.6 (ref 7–25)
CALCIUM SPEC-SCNC: 9.5 MG/DL (ref 8.6–10.5)
CHLORIDE SERPL-SCNC: 103 MMOL/L (ref 98–107)
CHOLEST SERPL-MCNC: 141 MG/DL (ref 0–200)
CO2 SERPL-SCNC: 29 MMOL/L (ref 22–29)
CREAT SERPL-MCNC: 0.81 MG/DL (ref 0.76–1.27)
DEPRECATED RDW RBC AUTO: 38.2 FL (ref 37–54)
EGFRCR SERPLBLD CKD-EPI 2021: 97.8 ML/MIN/1.73
EOSINOPHIL # BLD AUTO: 0.09 10*3/MM3 (ref 0–0.4)
EOSINOPHIL NFR BLD AUTO: 1.6 % (ref 0.3–6.2)
ERYTHROCYTE [DISTWIDTH] IN BLOOD BY AUTOMATED COUNT: 12.5 % (ref 12.3–15.4)
FERRITIN SERPL-MCNC: 82.3 NG/ML (ref 30–400)
GLOBULIN UR ELPH-MCNC: 3.4 GM/DL
GLUCOSE SERPL-MCNC: 107 MG/DL (ref 65–99)
HBA1C MFR BLD: 5.7 % (ref 4.8–5.6)
HCT VFR BLD AUTO: 35 % (ref 37.5–51)
HDLC SERPL-MCNC: 49 MG/DL (ref 40–60)
HGB BLD-MCNC: 11.9 G/DL (ref 13–17.7)
IMM GRANULOCYTES # BLD AUTO: 0.01 10*3/MM3 (ref 0–0.05)
IMM GRANULOCYTES NFR BLD AUTO: 0.2 % (ref 0–0.5)
IRON 24H UR-MRATE: 90 MCG/DL (ref 59–158)
IRON SATN MFR SERPL: 23 % (ref 20–50)
LDLC SERPL CALC-MCNC: 80 MG/DL (ref 0–100)
LDLC/HDLC SERPL: 1.64 {RATIO}
LYMPHOCYTES # BLD AUTO: 1.65 10*3/MM3 (ref 0.7–3.1)
LYMPHOCYTES NFR BLD AUTO: 30 % (ref 19.6–45.3)
MCH RBC QN AUTO: 28.9 PG (ref 26.6–33)
MCHC RBC AUTO-ENTMCNC: 34 G/DL (ref 31.5–35.7)
MCV RBC AUTO: 85 FL (ref 79–97)
MONOCYTES # BLD AUTO: 0.37 10*3/MM3 (ref 0.1–0.9)
MONOCYTES NFR BLD AUTO: 6.7 % (ref 5–12)
NEUTROPHILS NFR BLD AUTO: 3.33 10*3/MM3 (ref 1.7–7)
NEUTROPHILS NFR BLD AUTO: 60.6 % (ref 42.7–76)
NRBC BLD AUTO-RTO: 0 /100 WBC (ref 0–0.2)
PLATELET # BLD AUTO: 338 10*3/MM3 (ref 140–450)
PMV BLD AUTO: 10.2 FL (ref 6–12)
POTASSIUM SERPL-SCNC: 4.6 MMOL/L (ref 3.5–5.2)
PROT SERPL-MCNC: 8 G/DL (ref 6–8.5)
PSA SERPL-MCNC: 0.3 NG/ML (ref 0–4)
RBC # BLD AUTO: 4.12 10*6/MM3 (ref 4.14–5.8)
SODIUM SERPL-SCNC: 140 MMOL/L (ref 136–145)
TIBC SERPL-MCNC: 393 MCG/DL (ref 298–536)
TRANSFERRIN SERPL-MCNC: 264 MG/DL (ref 200–360)
TRIGL SERPL-MCNC: 59 MG/DL (ref 0–150)
TSH SERPL DL<=0.05 MIU/L-ACNC: 0.36 UIU/ML (ref 0.27–4.2)
VIT B12 BLD-MCNC: 594 PG/ML (ref 211–946)
VLDLC SERPL-MCNC: 12 MG/DL (ref 5–40)
WBC NRBC COR # BLD: 5.5 10*3/MM3 (ref 3.4–10.8)

## 2023-04-28 PROCEDURE — 36415 COLL VENOUS BLD VENIPUNCTURE: CPT

## 2023-04-28 PROCEDURE — 80061 LIPID PANEL: CPT

## 2023-04-28 PROCEDURE — 84443 ASSAY THYROID STIM HORMONE: CPT

## 2023-04-28 PROCEDURE — 83540 ASSAY OF IRON: CPT

## 2023-04-28 PROCEDURE — G0103 PSA SCREENING: HCPCS

## 2023-04-28 PROCEDURE — 82728 ASSAY OF FERRITIN: CPT

## 2023-04-28 PROCEDURE — 83036 HEMOGLOBIN GLYCOSYLATED A1C: CPT

## 2023-04-28 PROCEDURE — 85025 COMPLETE CBC W/AUTO DIFF WBC: CPT

## 2023-04-28 PROCEDURE — 82607 VITAMIN B-12: CPT

## 2023-04-28 PROCEDURE — 84466 ASSAY OF TRANSFERRIN: CPT

## 2023-04-28 PROCEDURE — 80053 COMPREHEN METABOLIC PANEL: CPT

## 2023-07-25 ENCOUNTER — OFFICE VISIT (OUTPATIENT)
Dept: SURGERY | Facility: CLINIC | Age: 68
End: 2023-07-25
Payer: MEDICARE

## 2023-07-25 VITALS
HEART RATE: 67 BPM | WEIGHT: 180 LBS | HEIGHT: 70 IN | TEMPERATURE: 98.2 F | SYSTOLIC BLOOD PRESSURE: 108 MMHG | DIASTOLIC BLOOD PRESSURE: 62 MMHG | OXYGEN SATURATION: 98 % | BODY MASS INDEX: 25.77 KG/M2

## 2023-07-25 DIAGNOSIS — L72.3 SEBACEOUS CYST: Primary | ICD-10-CM

## 2023-07-25 PROCEDURE — 1160F RVW MEDS BY RX/DR IN RCRD: CPT | Performed by: SURGERY

## 2023-07-25 PROCEDURE — 3078F DIAST BP <80 MM HG: CPT | Performed by: SURGERY

## 2023-07-25 PROCEDURE — 3074F SYST BP LT 130 MM HG: CPT | Performed by: SURGERY

## 2023-07-25 PROCEDURE — 99213 OFFICE O/P EST LOW 20 MIN: CPT | Performed by: SURGERY

## 2023-07-25 PROCEDURE — 1159F MED LIST DOCD IN RCRD: CPT | Performed by: SURGERY

## 2023-07-25 RX ORDER — IBUPROFEN 800 MG/1
1 TABLET ORAL 3 TIMES DAILY
COMMUNITY
Start: 2023-07-05

## 2023-07-25 RX ORDER — TRAZODONE HYDROCHLORIDE 100 MG/1
TABLET ORAL
COMMUNITY
Start: 2023-07-08

## 2023-07-25 RX ORDER — FERROUS SULFATE 325(65) MG
1 TABLET ORAL DAILY
COMMUNITY
Start: 2023-05-24

## 2023-07-25 NOTE — PROGRESS NOTES
"Patient: Arvind Vinson    YOB: 1955    Date: 07/25/2023    Primary Care Provider: Dorita Hill APRN    Chief Complaint   Patient presents with    Skin nodule, right clavicle       SUBJECTIVE:    History of present illness:  Patient is here for evaluation of a palpable \"nodule\" located beneath his right clavicle. He states nodule has been present for approximately 7 months and states it has increased in size. Patient denies any pain, but states he is having irritation there with his clothes rubbing it. He denies any bleeding or itching of the lesion.     Apparently this been present for the past several months and is increased in size causing the patient some dull discomfort worse with pressure nonradiating present in the right subclavicular position associated with a palpable mass.    The following portions of the patient's history were reviewed and updated as appropriate: allergies, current medications, past family history, past medical history, past social history, past surgical history and problem list.      Review of Systems   Constitutional:  Positive for unexpected weight change. Negative for chills and fever.   HENT:  Negative for trouble swallowing and voice change.    Eyes:  Negative for visual disturbance.   Respiratory:  Negative for apnea, cough, chest tightness, shortness of breath and wheezing.    Cardiovascular:  Negative for chest pain, palpitations and leg swelling.   Gastrointestinal:  Negative for abdominal distention, abdominal pain, anal bleeding, blood in stool, constipation, diarrhea, nausea, rectal pain and vomiting.   Endocrine: Negative for cold intolerance and heat intolerance.   Genitourinary:  Negative for difficulty urinating, dysuria, flank pain, scrotal swelling and testicular pain.   Musculoskeletal:  Negative for back pain, gait problem and joint swelling.   Skin:  Negative for color change, rash and wound.   Neurological:  Negative for dizziness, syncope, " speech difficulty, weakness, numbness and headaches.   Hematological:  Negative for adenopathy. Does not bruise/bleed easily.   Psychiatric/Behavioral:  Negative for confusion. The patient is not nervous/anxious.      Allergies:  No Known Allergies    Medications:    Current Outpatient Medications:     buprenorphine-naloxone (SUBOXONE) 8-2 MG per SL tablet, , Disp: , Rfl:     FeroSul 325 (65 Fe) MG tablet, Take 1 tablet by mouth Daily., Disp: , Rfl:     ibuprofen (ADVIL,MOTRIN) 800 MG tablet, Take 1 tablet by mouth 3 (Three) Times a Day., Disp: , Rfl:     lisinopril (PRINIVIL,ZESTRIL) 40 MG tablet, Take 1 tablet by mouth Daily., Disp: , Rfl:     ProAir  (90 Base) MCG/ACT inhaler, , Disp: , Rfl:     sertraline (ZOLOFT) 50 MG tablet, Take 1 tablet by mouth Daily., Disp: , Rfl:     sildenafil (REVATIO) 20 MG tablet, Take 5 tablets by mouth Daily As Needed (1 hr prior to sexual activity). 1 hr prior to sexual activity, Disp: 60 tablet, Rfl: 11    traZODone (DESYREL) 100 MG tablet, , Disp: , Rfl:     History:  Past Medical History:   Diagnosis Date    Anxiety     COVID-19 vaccine administered     Muna/Moderna.  2 vaccines    Depression     H/O exercise stress test     Hypertension        Past Surgical History:   Procedure Laterality Date    APPENDECTOMY N/A 4/30/2022    Procedure: APPENDECTOMY LAPAROSCOPIC;  Surgeon: Dusty Nava MD;  Location: [facility] OR;  Service: General;  Laterality: N/A;    FOOT SURGERY      PENIS PLAQUE EXCISION WITH CORPORAL PLICATION N/A 3/22/2022    Procedure: PENIS PLAQUE EXCISION WITH CORPORAL PLICATION;  Surgeon: Kal Collier MD;  Location: [facility] OR;  Service: Urology;  Laterality: N/A;       Family History   Problem Relation Age of Onset    Hypertension Mother     Hypertension Father     Hypertension Sister     Heart disease Brother        Social History     Tobacco Use    Smoking status: Never    Smokeless tobacco: Current     Types: Snuff    Tobacco comments:     pt  "reports used this am 3/22/22 at 1000   Vaping Use    Vaping Use: Never used   Substance Use Topics    Alcohol use: Not Currently     Alcohol/week: 4.0 standard drinks     Types: 4 Cans of beer per week     Comment: ETOH REHAB  6 WEEKS AGO; DRINKS beers 7-8 A WEEK NOW    Drug use: Not Currently     Types: Hydrocodone     Comment: pt reports \"4 years ago\"        OBJECTIVE:    Vital Signs:   Vitals:    07/25/23 1457   BP: 108/62   BP Location: Right arm   Patient Position: Sitting   Cuff Size: Adult   Pulse: 67   Temp: 98.2 °F (36.8 °C)   TempSrc: Temporal   SpO2: 98%   Weight: 81.6 kg (180 lb)   Height: 177.8 cm (70\")       Physical Exam:   General Appearance:    Alert, cooperative, in no acute distress   Head:    Normocephalic, without obvious abnormality, atraumatic   Eyes:            Lids and lashes normal, conjunctivae and sclerae normal, no   icterus, no pallor, corneas clear, PERRLA   Ears:    Ears appear intact with no abnormalities noted   Throat:   No oral lesions, no thrush, oral mucosa moist   Neck:   No adenopathy, supple, trachea midline, no thyromegaly, no   carotid bruit, no JVD   Lungs:     Clear to auscultation,respirations regular, even and                  unlabored    Heart:    Regular rhythm and normal rate, normal S1 and S2, no            murmur, no gallop, no rub, no click   Chest Wall:    No abnormalities observed   Abdomen:     Normal bowel sounds, no masses, no organomegaly, soft        non-tender, non-distended, no guarding, no rebound                tenderness   Extremities:   Moves all extremities well, no edema, no cyanosis, no             redness   Pulses:   Pulses palpable and equal bilaterally   Skin:   No bleeding, bruising or rash, small palpable mass beneath the right clavicle   Lymph nodes:   No palpable adenopathy   Neurologic:   Cranial nerves 2 - 12 grossly intact, sensation intact, DTR       present and equal bilaterally     Results Review:   I reviewed the patient's new " clinical results.  I reviewed the patient's new imaging results and agree with the interpretation.  I reviewed the patient's other test results and agree with the interpretation    Review of Systems was reviewed and confirmed as accurate as documented by the MA.    ASSESSMENT/PLAN:    1. Sebaceous cyst        Patient needs to undergo local excision, risk and benefits were explained to him, he understands and agrees.    I discussed the patients findings and my recommendations with patient        Electronically signed by Dusty Nava MD  07/26/23

## 2023-08-01 ENCOUNTER — PROCEDURE VISIT (OUTPATIENT)
Dept: SURGERY | Facility: CLINIC | Age: 68
End: 2023-08-01
Payer: MEDICARE

## 2023-08-01 VITALS
HEART RATE: 65 BPM | DIASTOLIC BLOOD PRESSURE: 68 MMHG | SYSTOLIC BLOOD PRESSURE: 142 MMHG | TEMPERATURE: 97.3 F | HEIGHT: 70 IN | BODY MASS INDEX: 26.23 KG/M2 | RESPIRATION RATE: 18 BRPM | OXYGEN SATURATION: 97 % | WEIGHT: 183.2 LBS

## 2023-08-01 DIAGNOSIS — R22.2 CHEST MASS: ICD-10-CM

## 2023-08-01 DIAGNOSIS — L72.3 SEBACEOUS CYST: Primary | ICD-10-CM

## 2023-08-01 DIAGNOSIS — R22.2 CHEST MASS: Primary | ICD-10-CM

## 2023-08-01 PROCEDURE — 12032 INTMD RPR S/A/T/EXT 2.6-7.5: CPT | Performed by: SURGERY

## 2023-08-01 PROCEDURE — 11403 EXC TR-EXT B9+MARG 2.1-3CM: CPT | Performed by: SURGERY

## 2023-08-02 LAB — REF LAB TEST METHOD: NORMAL

## 2023-08-08 NOTE — PROGRESS NOTES
"Patient: Arvind Vinson    YOB: 1955    Date: 08/09/2023    Primary Care Provider: Dorita Hill APRN    Chief Complaint   Patient presents with    Post-op Follow-up     Excision         History of present illness:  I saw the patient in the office today as a followup from their recent chest lesion excision, the pathology report did show a benign epidermoid cyst.  They state that they have done well and are having no problems.    Vital Signs:  Vitals:    08/09/23 1430   BP: 140/90   Pulse: 66   Temp: 97 øF (36.1 øC)   SpO2: 97%   Weight: 85.3 kg (188 lb)   Height: 177.8 cm (70\")       Physical Exam:   General Appearance:    Alert, cooperative, in no acute distress, wound clean dry without infection   Abdomen:     no masses, no organomegaly, soft non-tender, non-distended, no guarding, wounds are well healed   Chest:      Clear to ausculation       Assessment / Plan:    1. Post-operative state        I did discuss the situation with the patient today in the office and they have done well from their recent lesion excision, I don't think that the patient needs any further intervention and I need to see them back only if they have further problems. Pathology report was reviewed with the patient in the office.    Electronically signed by Dusty Nava MD  08/09/23                    "

## 2023-08-09 ENCOUNTER — OFFICE VISIT (OUTPATIENT)
Dept: SURGERY | Facility: CLINIC | Age: 68
End: 2023-08-09
Payer: MEDICARE

## 2023-08-09 VITALS
SYSTOLIC BLOOD PRESSURE: 140 MMHG | OXYGEN SATURATION: 97 % | HEIGHT: 70 IN | WEIGHT: 188 LBS | HEART RATE: 66 BPM | BODY MASS INDEX: 26.92 KG/M2 | TEMPERATURE: 97 F | DIASTOLIC BLOOD PRESSURE: 90 MMHG

## 2023-08-09 DIAGNOSIS — Z98.890 POST-OPERATIVE STATE: Primary | ICD-10-CM

## 2023-08-09 PROCEDURE — 1160F RVW MEDS BY RX/DR IN RCRD: CPT | Performed by: SURGERY

## 2023-08-09 PROCEDURE — 1159F MED LIST DOCD IN RCRD: CPT | Performed by: SURGERY

## 2023-08-09 PROCEDURE — 99024 POSTOP FOLLOW-UP VISIT: CPT | Performed by: SURGERY

## 2023-08-09 PROCEDURE — 3080F DIAST BP >= 90 MM HG: CPT | Performed by: SURGERY

## 2023-08-09 PROCEDURE — 3077F SYST BP >= 140 MM HG: CPT | Performed by: SURGERY

## 2023-08-21 ENCOUNTER — HOSPITAL ENCOUNTER (EMERGENCY)
Facility: HOSPITAL | Age: 68
Discharge: HOME | End: 2023-08-21
Attending: FAMILY MEDICINE
Payer: MEDICARE

## 2023-08-21 VITALS
BODY MASS INDEX: 26.92 KG/M2 | TEMPERATURE: 97.7 F | HEIGHT: 70 IN | DIASTOLIC BLOOD PRESSURE: 78 MMHG | HEART RATE: 51 BPM | WEIGHT: 188 LBS | OXYGEN SATURATION: 99 % | RESPIRATION RATE: 20 BRPM | SYSTOLIC BLOOD PRESSURE: 181 MMHG

## 2023-08-21 DIAGNOSIS — S91.332A PUNCTURE WOUND OF LEFT FOOT, INITIAL ENCOUNTER: Primary | ICD-10-CM

## 2023-08-21 PROCEDURE — 90471 IMMUNIZATION ADMIN: CPT

## 2023-08-21 PROCEDURE — 25010000002 TETANUS-DIPHTH-ACELL PERTUSSIS 5-2.5-18.5 LF-MCG/0.5 SUSPENSION PREFILLED SYRINGE

## 2023-08-21 PROCEDURE — 99283 EMERGENCY DEPT VISIT LOW MDM: CPT

## 2023-08-21 PROCEDURE — 90715 TDAP VACCINE 7 YRS/> IM: CPT

## 2023-08-21 RX ORDER — LEVOFLOXACIN 500 MG/1
500 TABLET, FILM COATED ORAL ONCE
Status: COMPLETED | OUTPATIENT
Start: 2023-08-21 | End: 2023-08-21

## 2023-08-21 RX ORDER — LEVOFLOXACIN 500 MG/1
500 TABLET, FILM COATED ORAL DAILY
Qty: 3 TABLET | Refills: 0 | Status: SHIPPED | OUTPATIENT
Start: 2023-08-21 | End: 2023-08-24

## 2023-08-21 RX ADMIN — LEVOFLOXACIN 500 MG: 500 TABLET, FILM COATED ORAL at 12:36

## 2023-08-21 RX ADMIN — TETANUS TOXOID, REDUCED DIPHTHERIA TOXOID AND ACELLULAR PERTUSSIS VACCINE, ADSORBED 0.5 ML: 5; 2.5; 8; 8; 2.5 SUSPENSION INTRAMUSCULAR at 12:36

## 2023-08-21 NOTE — DISCHARGE INSTRUCTIONS
You are updated on your tetanus vaccine.  Use the antibiotic prophylactically, start prescription tomorrow since you received your first dose in the ER.  Continue to monitor your foot for increased swelling, redness, drainage from the puncture site, or fever.  Follow-up with your primary provider for reevaluation return to the ER for any new or worsening symptoms.

## 2023-08-21 NOTE — ED PROVIDER NOTES
"Subjective:  History of Present Illness:    Patient is a 68-year-old male here today with a puncture wound to his left foot.  He reports that he stepped on a nail through his shoe on Friday.  Since then he has noted some pain with ambulation, but is here requesting a tetanus vaccine.  No drainage from the wound, has not noticed much swelling and no erythema.      Nurses Notes reviewed and agree, including vitals, allergies, social history and prior medical history.     REVIEW OF SYSTEMS: All systems reviewed and not pertinent unless noted.  Review of Systems    Past Medical History:   Diagnosis Date    Anxiety     COVID-19 vaccine administered     Muna/Moderna.  2 vaccines    Depression     H/O exercise stress test     Hypertension        Allergies:    Patient has no known allergies.      Past Surgical History:   Procedure Laterality Date    APPENDECTOMY N/A 4/30/2022    Procedure: APPENDECTOMY LAPAROSCOPIC;  Surgeon: Dusty Nava MD;  Location: [facility];  Service: General;  Laterality: N/A;    FOOT SURGERY      PENIS PLAQUE EXCISION WITH CORPORAL PLICATION N/A 3/22/2022    Procedure: PENIS PLAQUE EXCISION WITH CORPORAL PLICATION;  Surgeon: Kal Collier MD;  Location: [facility];  Service: Urology;  Laterality: N/A;         Social History     Socioeconomic History    Marital status:    Tobacco Use    Smoking status: Never    Smokeless tobacco: Current     Types: Snuff    Tobacco comments:     pt reports used this am 3/22/22 at 1000   Vaping Use    Vaping Use: Never used   Substance and Sexual Activity    Alcohol use: Not Currently     Alcohol/week: 4.0 standard drinks     Types: 4 Cans of beer per week     Comment: [facility]  6 WEEKS AGO; DRINKS beers 7-8 A WEEK NOW    Drug use: Not Currently     Types: Hydrocodone     Comment: pt reports \"4 years ago\"    Sexual activity: Never         Family History   Problem Relation Age of Onset    Hypertension Mother     Hypertension Father     " "Hypertension Sister     Heart disease Brother        Objective  Physical Exam:  BP (!) 181/78 (BP Location: Left arm, Patient Position: Sitting)   Pulse 51   Temp 97.7 øF (36.5 øC) (Oral)   Resp 20   Ht 177.8 cm (70\")   Wt 85.3 kg (188 lb)   SpO2 99%   BMI 26.98 kg/mý      Physical Exam  Vitals and nursing note reviewed.   Constitutional:       Appearance: Normal appearance. He is normal weight.   HENT:      Head: Normocephalic and atraumatic.   Cardiovascular:      Rate and Rhythm: Normal rate and regular rhythm.      Pulses: Normal pulses.      Heart sounds: Normal heart sounds.   Pulmonary:      Effort: Pulmonary effort is normal.      Breath sounds: Normal breath sounds.   Abdominal:      General: Abdomen is flat. Bowel sounds are normal. There is no distension.      Palpations: Abdomen is soft.      Tenderness: There is no abdominal tenderness.   Musculoskeletal:      Right lower leg: No edema.      Left lower leg: No edema.        Feet:    Skin:     General: Skin is warm and dry.      Capillary Refill: Capillary refill takes less than 2 seconds.   Neurological:      General: No focal deficit present.      Mental Status: He is alert and oriented to person, place, and time.   Psychiatric:         Mood and Affect: Mood normal.         Behavior: Behavior normal.       Procedures    ED Course:         Lab Results (last 24 hours)       ** No results found for the last 24 hours. **             No radiology results from the last 24 hrs       MDM      Initial impression of presenting illness: Patient is a 68-year-old male here today for a puncture wound to his left foot.    DDX: includes but is not limited to: Soft tissue injury, abscess, among others    Patient arrives hemodynamically stable with vitals interpreted by myself.     Pertinent features from physical exam: Puncture wound as described above.    Initial diagnostic plan: No testing needed    Diagnostic information from other sources: Medical " record    Interventions / Re-evaluation: Patient was provided a Tdap and Levaquin for prophylaxis.    Results/clinical rationale were discussed with patient and provided him with information on prophylactic treatment of a puncture wound, especially since it went through his shoe into his sole.    Consultations/Discussion of results with other physicians: None    Disposition plan: Discharged home in stable condition  -----        Final diagnoses:   Puncture wound of left foot, initial encounter          Zen Ordonez, APRN  08/21/23 5336

## 2024-08-20 ENCOUNTER — APPOINTMENT (OUTPATIENT)
Dept: GENERAL RADIOLOGY | Facility: HOSPITAL | Age: 69
End: 2024-08-20
Payer: MEDICARE

## 2024-08-20 ENCOUNTER — HOSPITAL ENCOUNTER (EMERGENCY)
Facility: HOSPITAL | Age: 69
Discharge: HOME | End: 2024-08-20
Attending: STUDENT IN AN ORGANIZED HEALTH CARE EDUCATION/TRAINING PROGRAM
Payer: MEDICARE

## 2024-08-20 VITALS
DIASTOLIC BLOOD PRESSURE: 89 MMHG | RESPIRATION RATE: 12 BRPM | SYSTOLIC BLOOD PRESSURE: 131 MMHG | BODY MASS INDEX: 27.77 KG/M2 | WEIGHT: 194 LBS | HEIGHT: 70 IN | HEART RATE: 65 BPM | OXYGEN SATURATION: 98 % | TEMPERATURE: 98.1 F

## 2024-08-20 DIAGNOSIS — M17.11 OSTEOARTHRITIS OF RIGHT KNEE, UNSPECIFIED OSTEOARTHRITIS TYPE: Primary | ICD-10-CM

## 2024-08-20 PROCEDURE — 99283 EMERGENCY DEPT VISIT LOW MDM: CPT

## 2024-08-20 PROCEDURE — 73562 X-RAY EXAM OF KNEE 3: CPT

## 2024-08-20 RX ADMIN — DICLOFENAC SODIUM 2 G: 10 GEL TOPICAL at 11:28

## 2024-08-20 NOTE — DISCHARGE INSTRUCTIONS
Rest, ice, elevate the knee at home.  Wear knee brace for compression.  Use Voltaren gel as prescribed as needed.  Follow-up with Dr. Parmar, orthopedic specialist, for further outpatient evaluation and definitive care of knee pain.  Follow-up with Dr. Schmitz, family medicine provider, to establish care with a PCP.  Return to the ER for new or worsening symptoms or acute concerns.

## 2024-08-20 NOTE — ED PROVIDER NOTES
Subjective:  Chief Complaint:  Knee pain    History of Present Illness:  Patient is a 69-year-old male with history of anxiety, depression, hypertension presenting to the ER with complaints of right knee pain.  Patient states that his been hurting for a few weeks.  States that it feels swollen.  States that he works on [employer] and is on his knees a lot while working.  Denies any specific injury.  Patient states there is some redness over the knee which concerned him for infection.  Patient not diabetic and denies fevers, body aches, nausea, vomiting, or any other infectious symptoms.  Denies history of blood clots.  Denies recent travel or surgery.  Denies hormone use.  Denies calf pain and any additional symptoms or complaints at this time.      Nurses Notes reviewed and agree, including vitals, allergies, social history and prior medical history.     REVIEW OF SYSTEMS: All systems reviewed and not pertinent unless noted.  Review of Systems   Musculoskeletal:         Right knee pain   All other systems reviewed and are negative.      Past Medical History:   Diagnosis Date    Anxiety     COVID-19 vaccine administered     Cardize/Moderna.  2 vaccines    Depression     H/O exercise stress test     Hypertension        Allergies:    Patient has no known allergies.      Past Surgical History:   Procedure Laterality Date    APPENDECTOMY N/A 4/30/2022    Procedure: APPENDECTOMY LAPAROSCOPIC;  Surgeon: Dusty Nava MD;  Location: [facility] OR;  Service: General;  Laterality: N/A;    FOOT SURGERY      PENIS PLAQUE EXCISION WITH CORPORAL PLICATION N/A 3/22/2022    Procedure: PENIS PLAQUE EXCISION WITH CORPORAL PLICATION;  Surgeon: Kal Collier MD;  Location: [facility];  Service: Urology;  Laterality: N/A;         Social History     Socioeconomic History    Marital status:    Tobacco Use    Smoking status: Never    Smokeless tobacco: Current     Types: Snuff    Tobacco comments:     pt reports used this am  "3/22/22 at 1000   Vaping Use    Vaping status: Never Used   Substance and Sexual Activity    Alcohol use: Not Currently     Alcohol/week: 4.0 standard drinks of alcohol     Types: 4 Cans of beer per week     Comment: [facility]  6 WEEKS AGO; DRINKS beers 7-8 A WEEK NOW    Drug use: Not Currently     Types: Hydrocodone     Comment: pt reports \"4 years ago\"    Sexual activity: Never         Family History   Problem Relation Age of Onset    Hypertension Mother     Hypertension Father     Hypertension Sister     Heart disease Brother        Objective  Physical Exam:  /89 (BP Location: Left arm, Patient Position: Sitting)   Pulse 65   Temp 98.1 °F (36.7 °C) (Oral)   Resp 12   Ht 177.8 cm (70\")   Wt 88 kg (194 lb)   SpO2 98%   BMI 27.84 kg/m²      Physical Exam  Vitals and nursing note reviewed.   Constitutional:       General: He is not in acute distress.     Appearance: He is not toxic-appearing.   HENT:      Head: Normocephalic and atraumatic.      Right Ear: External ear normal.      Left Ear: External ear normal.      Nose: Nose normal.   Eyes:      Extraocular Movements: Extraocular movements intact.      Conjunctiva/sclera: Conjunctivae normal.   Cardiovascular:      Rate and Rhythm: Normal rate.   Pulmonary:      Effort: Pulmonary effort is normal. No respiratory distress.   Abdominal:      General: There is no distension.   Musculoskeletal:         General: Normal range of motion.      Cervical back: Normal range of motion and neck supple.      Comments: Right lower extremity: 2+ pulses, no obvious deformity, there is mild to moderate swelling of the knee, range of motion is intact but is painful especially with flexion; no warmth, abrasions, open wounds, or obvious signs of infection; findings most consistent with osteoarthritis and effusion; negative Homans' sign, no pain with dorsiflexion, no streaking to the calf or significant swelling compared to the left leg   Skin:     General: Skin is warm " and dry.   Neurological:      General: No focal deficit present.      Mental Status: He is alert and oriented to person, place, and time.   Psychiatric:         Mood and Affect: Mood normal.         Behavior: Behavior normal.         Procedures    ED Course:         Lab Results (last 24 hours)       ** No results found for the last 24 hours. 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  FINDINGS:  A three view exam demonstrates no acute fracture or dislocation. The joint spaces demonstrate mild to moderate narrowing of all 3 joint compartments with mild osteophyte formation. There is a moderate knee effusion. Diffuse osteopenia noted. No soft tissue abnormality is seen.       Impression: No acute bony abnormality.  Degenerative change with effusion.      This report was signed and finalized on 8/20/2024 11:36 AM by Migdalia Salvador MD.          MDM  Patient evaluated in the ER for pain to right knee with no specific injury.  Does work with pa[employer] and is on his knees a lot.  He is hemodynamically stable, afebrile, nontoxic-appearing on exam.  No infectious symptoms.  Differential diagnosis includes but is not limited to osteoarthritis, muscle strain/sprain, ligament or tendon injury, among others.  Low concern for blood clot based on exam with negative Homans' sign, no pain with dorsiflexion, no significant swelling compared to the other leg and no overlying erythema or streaking to calf.  No recent travel or surgery or precipitating factors that would cause a blood clot.  Initial plan included x-ray of right knee and treatment with Voltaren gel.    X-ray did reveal mild to moderate narrowing of all 3 joint compartments with mild osteophyte formation and moderate knee effusion as well as osteopenia.  Discussed with patient that findings are most consistent with osteoarthritis and he does  have a moderate knee effusion.  He was given a knee brace.  RICE therapy recommended.  Patient advised to use Voltaren gel twice daily as needed.  Referral was placed for Dr. Parmar, orthopedic specialist, for further outpatient evaluation and definitive care.  Discussed with patient that they may want to do further outpatient testing and steroid injections.  He is agreeable with plan for discharge.  He also does note that he does not have a PCP.  Was given information for Dr. Schmitz.  Recommended following up with him to establish care or establishing care with PCP of his choice.  Precautions were given for return to the ER for any new or worsening symptoms.    Final diagnoses:   Osteoarthritis of right knee, unspecified osteoarthritis type          Zoey Madsen, PA-MARY  08/20/24 1154

## 2025-03-27 ENCOUNTER — APPOINTMENT (OUTPATIENT)
Dept: GENERAL RADIOLOGY | Facility: HOSPITAL | Age: 70
End: 2025-03-27
Payer: MEDICARE

## 2025-03-27 ENCOUNTER — HOSPITAL ENCOUNTER (EMERGENCY)
Facility: HOSPITAL | Age: 70
Discharge: HOME | End: 2025-03-27
Attending: STUDENT IN AN ORGANIZED HEALTH CARE EDUCATION/TRAINING PROGRAM
Payer: MEDICARE

## 2025-03-27 ENCOUNTER — APPOINTMENT (OUTPATIENT)
Dept: CT IMAGING | Facility: HOSPITAL | Age: 70
End: 2025-03-27
Payer: MEDICARE

## 2025-03-27 VITALS
OXYGEN SATURATION: 100 % | HEIGHT: 70 IN | SYSTOLIC BLOOD PRESSURE: 141 MMHG | RESPIRATION RATE: 18 BRPM | DIASTOLIC BLOOD PRESSURE: 88 MMHG | TEMPERATURE: 98 F | WEIGHT: 194 LBS | HEART RATE: 98 BPM | BODY MASS INDEX: 27.77 KG/M2

## 2025-03-27 DIAGNOSIS — S82.891A CLOSED FRACTURE OF RIGHT ANKLE, INITIAL ENCOUNTER: Primary | ICD-10-CM

## 2025-03-27 PROCEDURE — 99284 EMERGENCY DEPT VISIT MOD MDM: CPT | Performed by: STUDENT IN AN ORGANIZED HEALTH CARE EDUCATION/TRAINING PROGRAM

## 2025-03-27 PROCEDURE — 73630 X-RAY EXAM OF FOOT: CPT

## 2025-03-27 PROCEDURE — 25010000002 KETOROLAC TROMETHAMINE PER 15 MG: Performed by: STUDENT IN AN ORGANIZED HEALTH CARE EDUCATION/TRAINING PROGRAM

## 2025-03-27 PROCEDURE — 96372 THER/PROPH/DIAG INJ SC/IM: CPT

## 2025-03-27 PROCEDURE — 73700 CT LOWER EXTREMITY W/O DYE: CPT

## 2025-03-27 PROCEDURE — 73590 X-RAY EXAM OF LOWER LEG: CPT

## 2025-03-27 PROCEDURE — 73610 X-RAY EXAM OF ANKLE: CPT

## 2025-03-27 RX ORDER — IBUPROFEN 600 MG/1
600 TABLET, FILM COATED ORAL ONCE
Status: COMPLETED | OUTPATIENT
Start: 2025-03-27 | End: 2025-03-27

## 2025-03-27 RX ORDER — IBUPROFEN 600 MG/1
600 TABLET, FILM COATED ORAL EVERY 6 HOURS PRN
Qty: 20 TABLET | Refills: 0 | Status: SHIPPED | OUTPATIENT
Start: 2025-03-27

## 2025-03-27 RX ORDER — ACETAMINOPHEN 500 MG
1000 TABLET ORAL ONCE
Status: COMPLETED | OUTPATIENT
Start: 2025-03-27 | End: 2025-03-27

## 2025-03-27 RX ORDER — DIAZEPAM 2 MG/1
2 TABLET ORAL ONCE
Status: COMPLETED | OUTPATIENT
Start: 2025-03-27 | End: 2025-03-27

## 2025-03-27 RX ORDER — KETOROLAC TROMETHAMINE 30 MG/ML
30 INJECTION, SOLUTION INTRAMUSCULAR; INTRAVENOUS ONCE
Status: COMPLETED | OUTPATIENT
Start: 2025-03-27 | End: 2025-03-27

## 2025-03-27 RX ADMIN — DIAZEPAM 2 MG: 2 TABLET ORAL at 11:26

## 2025-03-27 RX ADMIN — ACETAMINOPHEN 1000 MG: 500 TABLET, FILM COATED ORAL at 10:54

## 2025-03-27 RX ADMIN — KETOROLAC TROMETHAMINE 30 MG: 30 INJECTION, SOLUTION INTRAMUSCULAR; INTRAVENOUS at 13:34

## 2025-03-27 RX ADMIN — IBUPROFEN 600 MG: 600 TABLET ORAL at 10:54

## 2025-03-27 NOTE — ED PROVIDER NOTES
[facility] EMERGENCY DEPARTMENT  Emergency Department Encounter  Emergency Medicine Physician Note       Pt Name: Arvind Vinson  MRN: [id number]  Pt :   1955  Room Number:  02SF02  Date of encounter:  3/27/2025  PCP: Provider, No Known  ED Provider: Sunday Krishnan MD    Historian: Patient      HPI:  Chief Complaint: Fall        Context: Arvind Vinson is a 69 y.o. male who presents to the ED for fall.  This occurred this morning.  Patient states he was walking on some steps when he tripped on his right leg.  He fell down onto his right ankle and had associated pain and swelling.  No numbness or weakness.  Nuys other injuries no head or neck injury.  No pain in any arm or left leg.  Denies blood thinner use.      PAST MEDICAL HISTORY  Past Medical History:   Diagnosis Date    Anxiety     COVID-19 vaccine administered     Muna/Moderna.  2 vaccines    Depression     H/O exercise stress test     Hypertension          PAST SURGICAL HISTORY  Past Surgical History:   Procedure Laterality Date    APPENDECTOMY N/A 2022    Procedure: APPENDECTOMY LAPAROSCOPIC;  Surgeon: Dusty Nava MD;  Location: [facility];  Service: General;  Laterality: N/A;    FOOT SURGERY      PENIS PLAQUE EXCISION WITH CORPORAL PLICATION N/A 3/22/2022    Procedure: PENIS PLAQUE EXCISION WITH CORPORAL PLICATION;  Surgeon: Kal Collier MD;  Location: [facility];  Service: Urology;  Laterality: N/A;         FAMILY HISTORY  Family History   Problem Relation Age of Onset    Hypertension Mother     Hypertension Father     Hypertension Sister     Heart disease Brother          SOCIAL HISTORY  Social History     Socioeconomic History    Marital status:    Tobacco Use    Smoking status: Never    Smokeless tobacco: Current     Types: Snuff    Tobacco comments:     pt reports used this am 3/22/22 at 1000   Vaping Use    Vaping status: Never Used   Substance and Sexual Activity    Alcohol use: Not  "Currently     Alcohol/week: 4.0 standard drinks of alcohol     Types: 4 Cans of beer per week     Comment: [facility]  6 WEEKS AGO; DRINKS beers 7-8 A WEEK NOW    Drug use: Not Currently     Types: Hydrocodone     Comment: pt reports \"4 years ago\"    Sexual activity: Never         ALLERGIES  Patient has no known allergies.        REVIEW OF SYSTEMS  As noted in HPI      PHYSICAL EXAM    I have reviewed the triage vital signs and nursing notes.    ED Triage Vitals [03/27/25 0950]   Temp Heart Rate Resp BP SpO2   98 °F (36.7 °C) 98 18 (!) 187/95 100 %      Temp src Heart Rate Source Patient Position BP Location FiO2 (%)   Oral Monitor Sitting Left arm --       Physical Exam  HENT:      Head: Atraumatic.   Eyes:      Extraocular Movements: Extraocular movements intact.   Cardiovascular:      Pulses: Normal pulses.   Pulmonary:      Effort: Pulmonary effort is normal. No respiratory distress.   Abdominal:      General: There is no distension.      Palpations: Abdomen is soft.   Musculoskeletal:      Cervical back: Neck supple. No tenderness.      Comments: Right ankle no gross deformity however there is moderate ecchymosis and tenderness medially and laterally.  No proximal tenderness of the knee or right hip.  Palpable DP PT pulses.  Compartments of the calf soft.  Range of motion restricted due to swelling and pain.  No skin tenting.   Neurological:      Mental Status: He is alert.         LAB RESULTS  No results found for this or any previous visit (from the past 24 hours).    If labs were ordered, I independently reviewed the results and considered them in treating the patient.        RADIOLOGY  CT Lower Extremity Right Without Contrast  Result Date: 3/27/2025  PROCEDURE: CT LOWER EXTREMITY RIGHT WO CONTRAST-   HISTORY: Ankle frx characterization  TECHNIQUE: Thin section axial CT with sagittal and coronal reconstructions.   FINDINGS: Comminuted fractures are seen involving the distal fibular shaft, medial malleolus " and posterior malleolus. The medial malleolus fracture is transverse with up to 13 mm of displacement. The posterior malleolus fracture is associated with 12 mm of displacement. The fibular fracture is displaced posteriorly up to 10 mm..  There is posterior dislocation with lateral subluxation.  An old fracture fragment is seen along the dorsal talar neck.      Fracture dislocation as above.    This study was performed with techniques to keep radiation doses as low as reasonably achievable (ALARA). Individualized dose reduction techniques using automated exposure control or adjustment of vA and/or kV according to the patient size were employed.  This report was signed and finalized on 3/27/2025 12:31 PM by Yair Foster MD.      XR Tibia Fibula 2 View Right  Result Date: 3/27/2025  PROCEDURE: XR TIBIA FIBULA 2 VW RIGHT-  HISTORY: R/o maisonneuve frx  FINDINGS:  Two views show fractures of the distal fibular shaft and medial malleolus.  There is moderate lateral displacement.  No fractures of the more proximal tibia and fibula are seen.  The joint spaces appear normal.      Distal fibular and medial malleolus fractures as above.    This report was signed and finalized on 3/27/2025 11:36 AM by Yair Foster MD.      XR Foot 3+ View Right  Result Date: 3/27/2025  PROCEDURE: XR FOOT 3+ VW RIGHT-  HISTORY: eval foot frx  FINDINGS:  Three views show fractures of the distal fibula, medial malleolus and dorsal talus. The dorsal talar fracture is old with ununited bone fragment. Midfoot and hindfoot are unremarkable for acute process.  Moderate degenerative changes of the 1st MTP joint are seen.      1. Chronic appearing fracture fragment along the dorsal distal talus. 2. Acute ankle fractures as above.     This report was signed and finalized on 3/27/2025 11:36 AM by Yair Foster MD.      XR Ankle 3+ View Right  Result Date: 3/27/2025  PROCEDURE: XR ANKLE 3+ VW RIGHT-  HISTORY: R ankle frx after fall  FINDINGS:  Three  views show fractures of the distal fibula and medial malleolus.  There is moderate lateral displacement.  Posterior lateral subluxation is noted of the ankle mortise.  It is unclear whether the posterior malleolus is intact. Possible joint body and/or old fracture fragment is seen along the dorsal talar neck.      Fracture subluxation as above.    This report was signed and finalized on 3/27/2025 11:10 AM by Yair Foster MD.        PROCEDURES    FX Dislocation    Date/Time: 3/27/2025 3:45 PM    Performed by: Sunday Krishnan MD  Authorized by: Sunady Krishnan MD    Consent:     Consent obtained:  Verbal    Consent given by:  Patient    Risks discussed:  Nerve damage, pain and vascular damage    Alternatives discussed:  No treatment  Universal protocol:     Procedure explained and questions answered to patient or proxy's satisfaction: yes      Relevant documents present and verified: yes      Imaging studies available: yes      Site/side marked: yes      Immediately prior to procedure, a time out was called: yes      Patient identity confirmed:  Arm band and verbally with patient  Injury:     Injury location:  Ankle    Ankle injury location:  R ankle    Ankle fracture type: trimalleolar    Pre-procedure details:     Distal neurologic exam:  Normal    Distal perfusion: distal pulses strong and brisk capillary refill      Range of motion: reduced    Sedation:     Sedation type:  None  Anesthesia:     Anesthesia method:  None  Procedure details:     Manipulation performed: yes      Skeletal traction used: yes      Reduction successful: yes      X-ray confirmed reduction: Patient refused.      Immobilization:  Splint    Splint type:  Ankle stirrup and short leg    Supplies used:  Fiberglass    Attestation: Splint applied and adjusted personally by me    Post-procedure details:     Distal neurologic exam:  Normal    Distal perfusion: distal pulses strong and brisk capillary refill      Range of motion: unchanged       Procedure completion:  Tolerated well, no immediate complications      No orders to display       MEDICATIONS GIVEN IN ER    Medications   ibuprofen (ADVIL,MOTRIN) tablet 600 mg (600 mg Oral Given 3/27/25 1054)   acetaminophen (TYLENOL) tablet 1,000 mg (1,000 mg Oral Given 3/27/25 1054)   diazePAM (VALIUM) tablet 2 mg (2 mg Oral Given 3/27/25 1126)   ketorolac (TORADOL) injection 30 mg (30 mg Intramuscular Given 3/27/25 1334)         MEDICAL DECISION MAKING, PROGRESS, and CONSULTS    All labs, if obtained, have been independently reviewed by me.  All radiology studies, if obtained, have been reviewed by me and the radiologist dictating the report.  All EKG's, if obtained, have been independently viewed and interpreted by me.      Discussion below represents my analysis of pertinent findings related to patient's condition, differential diagnosis, treatment plan and final disposition.                         Differential diagnosis:    Fracture, dislocation, contusion, laceration, others.      Additional sources:    - Discussed/ obtained information from independent historians:      - External (non-ED) record review: Discharge summary 4/30/2022    - Chronic or social conditions impacting care:  history of opioid dependence, managed on Suboxone    - Shared decision making:        Orders placed during this visit:  Orders Placed This Encounter   Procedures    FX Dislocation Initial    [employer] 13. Crutches (); Right sided injury    XR Ankle 3+ View Right    XR Tibia Fibula 2 View Right    XR Foot 3+ View Right    CT Lower Extremity Right Without Contrast    Ambulatory Referral to Orthopedic Surgery         Additional orders considered but not ordered:      ED Course/MDM Discussion:    Patient is a 69-year-old male who presented after a ground-level fall.  Patient injured his right ankle but denies other trauma.    Plain films of the right ankle demonstrate at least a bimalleolar fracture with mild  displacement on my interpretation.    I discussed findings with on-call orthopedist Dr. Mckeon and he agrees with reduction and immobilization in sugar-tong and posterior short splint.    Patient sent for CT for better characterization.    He was given multimodal analgesia for pain.  He does have a history of opioid dependence and is currently on Suboxone and declines any narcotics.    Patient was about to elope from the department when I returned to his room to update him on the plan.  His son had a wheelchair in the room about to take him to his car.  I discussed that he would benefit from closed reduction and immobilization in splint and discussed the means of doing this including but not limited to analgesia, deep sedation.  Patient was eager for discharge however I was able to redirect him back into the bed and for him to allow for splint application. He was not agreeable to closed reduction under deep sedation. Splint application was performed with traction for better alignment. He was placed in a stirrup and posterior short leg splint.  I wished to obtain postreduction films however patient was ready to leave and I obliged with this request. I provided crutches to help with ambulation and sent referral to orthopedics.  Counseled on red flag symptoms and strict return precautions.        ED Course as of 03/28/25 0608   Thu Mar 27, 2025   1415 Arrived to patient's room and he was in the wheelchair about to leave.  I was able to verbally de-escalate and have him remain in the room for splint application. [DW]      ED Course User Index  [DW] Sunday Krishnan MD              Consultants:      Shared Decision Making:  After my consideration of clinical presentation and any laboratory/radiology studies obtained, I discussed the findings with the patient/patient representative who is in agreement with the treatment plan and the final disposition.   Risks and benefits of discharge and/or observation/admission were  discussed.       AS OF 06:08 EDT VITALS:    BP - 141/88  HR - 98  TEMP - 98 °F (36.7 °C) (Oral)  O2 SATS - 100%                  DIAGNOSIS  Final diagnoses:   Closed fracture of right ankle, initial encounter         DISPOSITION  ED Disposition       ED Disposition   Discharge    Condition   Stable    Comment   --                   Please note that portions of this document were completed with voice recognition software.        Sunday Krishnan MD  03/28/25 0631

## 2025-03-27 NOTE — DISCHARGE INSTRUCTIONS
Follow-up with orthopedics as instructed.  Referral has been placed for Dr. Mckeon  You may take Tylenol over-the-counter as needed for pain.  Please also take Motrin as needed, as prescribed.  Please read attached splint care instructions.  Do not hesitate to return if symptoms worsen in any way.

## (undated) DEVICE — GLV SURG SENSICARE PI LF PF 7.5 GRN STRL

## (undated) DEVICE — RICH MAJOR PROCEDURE: Brand: MEDLINE INDUSTRIES, INC.

## (undated) DEVICE — 2, DISPOSABLE SUCTION/IRRIGATOR WITHOUT DISPOSABLE TIP: Brand: STRYKEFLOW

## (undated) DEVICE — SUT VIC 0/0 UR6 27IN DYED J603H

## (undated) DEVICE — SYR LUERLOK 50ML

## (undated) DEVICE — ENDOPATH ETS-FLEX45 ARTICULATING ENDOSCOPIC LINEAR CUTTER, NO RELOAD: Brand: ENDOPATH

## (undated) DEVICE — ENDOPATH XCEL BLADELESS TROCARS WITH STABILITY SLEEVES: Brand: ENDOPATH XCEL

## (undated) DEVICE — ST BLD COL SAFETYLOK LS 21GA 3/4IN 12IN

## (undated) DEVICE — RICH GENERAL LAPAROSCOPY: Brand: MEDLINE INDUSTRIES, INC.

## (undated) DEVICE — SUT VIC 4/0 RB1 27IN VCP214H

## (undated) DEVICE — DECANT BG O JET

## (undated) DEVICE — SOL NS 500ML

## (undated) DEVICE — BNDG GZ SOF-FORM CONFRM 2X75IN LF STRL

## (undated) DEVICE — BLD CLIP UNIV SURG GRY

## (undated) DEVICE — SCRB SURG BACTOSHIELD CHG 4PCT 4OZ

## (undated) DEVICE — DRSNG GZ PETROLTM XEROFORM CURAD 1X8IN STRL

## (undated) DEVICE — SUT GUT CHRM 3/0 PS2 27IN 1638H

## (undated) DEVICE — SLV SCD CALF HEMOFORCE DVT THERP REPROC MD

## (undated) DEVICE — UNDYED BRAIDED (POLYGLACTIN 910), SYNTHETIC ABSORBABLE SUTURE: Brand: COATED VICRYL

## (undated) DEVICE — SYR LL TP 10ML STRL

## (undated) DEVICE — GLV SURG SENSICARE W/ALOE PF LF 7 STRL

## (undated) DEVICE — GLV SURG SENSICARE W/ALOE PF LF 8.5 STRL

## (undated) DEVICE — NDL HYPO ECLPS SFTY 25G 1 1/2IN

## (undated) DEVICE — ELECTRD NDL EZ CLN MOD 2.75IN

## (undated) DEVICE — ENDOPATH XCEL UNIVERSAL TROCAR STABLILITY SLEEVES: Brand: ENDOPATH XCEL

## (undated) DEVICE — SYR LUERLOK 20CC BX/50

## (undated) DEVICE — BNDG ELAS SLF ADHR 1IN 5YD COLR PK LF

## (undated) DEVICE — TOTAL TRAY, 16FR 10ML SIL FOLEY, URN: Brand: MEDLINE

## (undated) DEVICE — DRN PENRS 1/4X18IN LTX